# Patient Record
Sex: MALE | Race: WHITE | NOT HISPANIC OR LATINO | Employment: UNEMPLOYED | URBAN - METROPOLITAN AREA
[De-identification: names, ages, dates, MRNs, and addresses within clinical notes are randomized per-mention and may not be internally consistent; named-entity substitution may affect disease eponyms.]

---

## 2018-09-06 ENCOUNTER — OFFICE VISIT (OUTPATIENT)
Dept: URGENT CARE | Facility: CLINIC | Age: 33
End: 2018-09-06
Payer: COMMERCIAL

## 2018-09-06 VITALS
HEART RATE: 77 BPM | DIASTOLIC BLOOD PRESSURE: 72 MMHG | TEMPERATURE: 98 F | BODY MASS INDEX: 20.99 KG/M2 | RESPIRATION RATE: 16 BRPM | WEIGHT: 155 LBS | HEIGHT: 72 IN | SYSTOLIC BLOOD PRESSURE: 111 MMHG

## 2018-09-06 DIAGNOSIS — A56.01 CHLAMYDIAL URETHRITIS IN MALE: Primary | ICD-10-CM

## 2018-09-06 DIAGNOSIS — N34.2 URETHRITIS: ICD-10-CM

## 2018-09-06 PROCEDURE — 99213 OFFICE O/P EST LOW 20 MIN: CPT | Mod: 25,S$GLB,, | Performed by: INTERNAL MEDICINE

## 2018-09-06 PROCEDURE — 96372 THER/PROPH/DIAG INJ SC/IM: CPT | Mod: S$GLB,,, | Performed by: INTERNAL MEDICINE

## 2018-09-06 PROCEDURE — 3008F BODY MASS INDEX DOCD: CPT | Mod: CPTII,S$GLB,, | Performed by: INTERNAL MEDICINE

## 2018-09-06 RX ORDER — AZITHROMYCIN 500 MG/1
TABLET, FILM COATED ORAL
Qty: 4 TABLET | Refills: 0 | Status: SHIPPED | OUTPATIENT
Start: 2018-09-06 | End: 2019-10-22 | Stop reason: ALTCHOICE

## 2018-09-06 RX ORDER — EMTRICITABINE AND TENOFOVIR DISOPROXIL FUMARATE 100; 150 MG/1; MG/1
1 TABLET, FILM COATED ORAL DAILY
COMMUNITY

## 2018-09-06 RX ORDER — CEFTRIAXONE 1 G/1
500 INJECTION, POWDER, FOR SOLUTION INTRAMUSCULAR; INTRAVENOUS
Status: COMPLETED | OUTPATIENT
Start: 2018-09-06 | End: 2018-09-06

## 2018-09-06 RX ORDER — DOXYCYCLINE 100 MG/1
100 CAPSULE ORAL 2 TIMES DAILY
Qty: 14 CAPSULE | Refills: 0 | Status: SHIPPED | OUTPATIENT
Start: 2018-09-06 | End: 2018-09-13

## 2018-09-06 RX ADMIN — CEFTRIAXONE 500 MG: 1 INJECTION, POWDER, FOR SOLUTION INTRAMUSCULAR; INTRAVENOUS at 04:09

## 2018-09-06 NOTE — PROGRESS NOTES
Subjective:       Patient ID: Neel Soliman is a 32 y.o. male.    Vitals:  height is 6' (1.829 m) and weight is 70.3 kg (155 lb). His temperature is 98.2 °F (36.8 °C). His blood pressure is 111/72 and his pulse is 77. His respiration is 16.     Chief Complaint: Exposure to STD    Pt recntly tested positive for gonorrhea, was treated prior  to testing. Pt states sympotoms have come back. Pt also thinks he has has chlamydia and wants to just be treated       Review of Systems   Constitution: Negative for fever.   Eyes: Negative for blurred vision.   Cardiovascular: Negative for chest pain.   Skin: Negative for rash.   Musculoskeletal: Negative for back pain and joint pain.   Gastrointestinal: Negative for abdominal pain, diarrhea, nausea and vomiting.   Psychiatric/Behavioral: The patient is not nervous/anxious.        Objective:      Physical Exam   Constitutional: He appears well-developed and well-nourished.   HENT:   Head: Normocephalic and atraumatic.   Eyes: Conjunctivae and EOM are normal. Pupils are equal, round, and reactive to light.   Nursing note and vitals reviewed.      Assessment:       1. Chlamydial urethritis in male    2. Urethritis        Plan:         Chlamydial urethritis in male  -     azithromycin (ZITHROMAX) 500 MG tablet; Take 4 po now  Dispense: 4 tablet; Refill: 0    Urethritis  -     cefTRIAXone injection 500 mg; Inject 0.5 g (500 mg total) into the muscle one time.  -     doxycycline (VIBRAMYCIN) 100 MG Cap; Take 1 capsule (100 mg total) by mouth 2 (two) times daily. for 7 days  Dispense: 14 capsule; Refill: 0

## 2018-12-28 ENCOUNTER — OFFICE VISIT (OUTPATIENT)
Dept: URGENT CARE | Facility: CLINIC | Age: 33
End: 2018-12-28
Payer: COMMERCIAL

## 2018-12-28 VITALS
DIASTOLIC BLOOD PRESSURE: 81 MMHG | HEIGHT: 72 IN | OXYGEN SATURATION: 96 % | TEMPERATURE: 97 F | RESPIRATION RATE: 19 BRPM | WEIGHT: 150 LBS | SYSTOLIC BLOOD PRESSURE: 138 MMHG | BODY MASS INDEX: 20.32 KG/M2 | HEART RATE: 88 BPM

## 2018-12-28 DIAGNOSIS — Z86.39 HISTORY OF ELEVATED GLUCOSE: ICD-10-CM

## 2018-12-28 DIAGNOSIS — R30.0 DYSURIA: ICD-10-CM

## 2018-12-28 DIAGNOSIS — Z72.51 UNPROTECTED SEX: Primary | ICD-10-CM

## 2018-12-28 LAB
BILIRUB UR QL STRIP: NEGATIVE
GLUCOSE UR QL STRIP: NEGATIVE
KETONES UR QL STRIP: NEGATIVE
LEUKOCYTE ESTERASE UR QL STRIP: NEGATIVE
PH, POC UA: 7.5 (ref 5–8)
POC BLOOD, URINE: NEGATIVE
POC NITRATES, URINE: NEGATIVE
PROT UR QL STRIP: NEGATIVE
SP GR UR STRIP: 1.01 (ref 1–1.03)
UROBILINOGEN UR STRIP-ACNC: NORMAL (ref 0.3–2.2)

## 2018-12-28 PROCEDURE — 81003 URINALYSIS AUTO W/O SCOPE: CPT | Mod: QW,S$GLB,, | Performed by: FAMILY MEDICINE

## 2018-12-28 PROCEDURE — 3008F BODY MASS INDEX DOCD: CPT | Mod: CPTII,S$GLB,, | Performed by: FAMILY MEDICINE

## 2018-12-28 PROCEDURE — 99214 OFFICE O/P EST MOD 30 MIN: CPT | Mod: 25,S$GLB,, | Performed by: FAMILY MEDICINE

## 2018-12-28 NOTE — PATIENT INSTRUCTIONS
If You Think You Have an STD  Treating a sexually transmitted disease (STD) early limits the problems they can cause. If you have an STD, get treated right away. Ask your partner to be tested, too. Then avoid sex until youve finished treatment and your healthcare provider says its OK to have sex again.    Follow your treatment plan  Treatment depends on the type of STD you have. Common treatments include injections and oral pills or liquids. Creams and gels can be applied to sores caused by certain STDs. Follow the tips below:  · Get new treatment for each new STD.  · Dont use old medicine, even for the same STD. Use medicines as directed.  · Dont share medicine unless instructed to do so by your healthcare provider or clinic.  Talk to your partner  If you have an STD, its your duty to tell all your recent partners so they can be tested and treated. This is one important way to prevent the disease from being spread. Telling a partner that you have an STD can be hard. You may be embarrassed, angry, or afraid. Its often unclear who had the STD first. So try not to place blame. Your healthcare provider may offer some advice on how to begin.  Prevent future problems  Even after youve been treated, you can still be infected again. This is a common problem. It happens when a partner passes the STD back to you. To avoid this, your partner must be tested. He or she may also need treatment. After treatment, go to any scheduled follow-up visits. Then prevent future problems with safer sex. Limit your number of partners and always use a latex condom.  Diagnosing STDS  Your healthcare provider will take a health history and examine you. During your health history, you will be asked about your sex habits and health history. You may also be asked about drug use. Give honest answers. Your healthcare provider will then check your body for signs of STDs. He or she also may perform one or more of the following  tests:  · Fluid is swabbed from any sores. Samples also may be taken from the vagina, penis, mouth, or rectum. The samples are then tested for STDs.  · Blood or urine samples may be taken. They are checked for viruses or bacteria that cause STDs.  · For women, cells from the cervix (where the vagina and uterus meet) are checked for signs of cancer. This is called a Pap smear. If cell changes are found, a magnifying scope may be used to take a closer look (colposcopy).   Date Last Reviewed: 12/1/2016  © 8139-1690 [employer]. [address] [place] [address], [place] [address]. All rights reserved. This information is not intended as a substitute for professional medical care. Always follow your healthcare professional's instructions.      Follow up with your doctor in a few days as needed.  Return to the urgent care or go to the ER if symptoms get worse.    Christiano Springer MD

## 2018-12-28 NOTE — PROGRESS NOTES
Subjective:       Patient ID: Neel Soliman is a 33 y.o. male.    Vitals:  height is 6' (1.829 m) and weight is 68 kg (150 lb). His oral temperature is 97.1 °F (36.2 °C). His blood pressure is 138/81 and his pulse is 88. His respiration is 19 and oxygen saturation is 96%.     Chief Complaint: STD CHECK (Pt has dysuria, itching. Pt denies discharge. Pt also requests bloodwork for A1c and hematocrit levels.)    Pt wants a full STI panel as well as bloodwork to check his A1c and hematocrit levels. Pt has a partner with STI symptoms      Exposure to STD   The patient's primary symptoms include genital itching. The patient's pertinent negatives include no penile discharge, penile pain, scrotal swelling or testicular pain. This is a new problem. The current episode started in the past 7 days (5 days ago). The problem occurs constantly. The problem has been unchanged. The patient is experiencing no pain. Associated symptoms include dysuria. Pertinent negatives include no abdominal pain, chills, fever, frequency, nausea, rash, urgency or vomiting. The symptoms are aggravated by urination, intercourse and activity. He has tried nothing for the symptoms. He is sexually active. He inconsistently uses condoms. Yes, his partner has an STD.   pt also c/o having elevated glucose level on his recent lab test.  Fasting glucose was 103.  Pt has no headache, no dizziness, no weakness, no urinary symptoms.    Constitution: Negative for chills and fever.   Neck: Negative for painful lymph nodes.   Gastrointestinal: Negative for abdominal pain, nausea and vomiting.   Genitourinary: Positive for dysuria. Negative for frequency, urgency, urine decreased, hematuria, history of kidney stones, genital trauma, painful intercourse, genital sore, penile discharge, painful ejaculation, penile pain, penile swelling, scrotal swelling and testicular pain.   Musculoskeletal: Negative for back pain.   Skin: Negative for rash, lesion and erythema.    Hematologic/Lymphatic: Negative for swollen lymph nodes.       Objective:      Physical Exam   Constitutional: He is oriented to person, place, and time. He appears well-developed and well-nourished.   HENT:   Head: Normocephalic and atraumatic.   Eyes: EOM are normal. Pupils are equal, round, and reactive to light.   Neck: Normal range of motion. Neck supple. No JVD present. No tracheal deviation present. No thyromegaly present.   Cardiovascular: Normal rate, regular rhythm and normal heart sounds. Exam reveals no gallop and no friction rub.   No murmur heard.  Pulmonary/Chest: Breath sounds normal. No respiratory distress. He has no wheezes. He has no rales. He exhibits no tenderness.   Abdominal: Soft. Bowel sounds are normal. He exhibits no distension and no mass. There is no tenderness. There is no rebound and no guarding. No hernia.   Genitourinary: Penis normal.   Genitourinary Comments: No penile discharge, no genital lesion.   Musculoskeletal: Normal range of motion. He exhibits no edema, tenderness or deformity.   Lymphadenopathy:     He has no cervical adenopathy.   Neurological: He is alert and oriented to person, place, and time. He displays normal reflexes. No cranial nerve deficit. He exhibits normal muscle tone. Coordination normal.   Skin: Skin is warm. Capillary refill takes less than 2 seconds. No rash noted. No erythema. No pallor.   Psychiatric: He has a normal mood and affect. His behavior is normal. Judgment and thought content normal.   Vitals reviewed.      Assessment:       1. Dysuria    2. Unprotected sex    3. History of elevated glucose        Plan:         Dysuria  -     POCT Urinalysis, Dipstick, Automated, W/O Scope    Unprotected sex  -     C. trachomatis/N. gonorrhoeae by AMP DNA  -     RPR  -     HIV-1 and HIV-2 antibodies  -     Herpes simplex type 1&2 IgG,Herpes titer  -     Herpes simplex type 1 & 2 IgM,Herpes IgM  -     Hepatitis panel, acute    History of elevated glucose  -      Hemoglobin A1c          Patient Instructions       If You Think You Have an STD  Treating a sexually transmitted disease (STD) early limits the problems they can cause. If you have an STD, get treated right away. Ask your partner to be tested, too. Then avoid sex until youve finished treatment and your healthcare provider says its OK to have sex again.    Follow your treatment plan  Treatment depends on the type of STD you have. Common treatments include injections and oral pills or liquids. Creams and gels can be applied to sores caused by certain STDs. Follow the tips below:  · Get new treatment for each new STD.  · Dont use old medicine, even for the same STD. Use medicines as directed.  · Dont share medicine unless instructed to do so by your healthcare provider or clinic.  Talk to your partner  If you have an STD, its your duty to tell all your recent partners so they can be tested and treated. This is one important way to prevent the disease from being spread. Telling a partner that you have an STD can be hard. You may be embarrassed, angry, or afraid. Its often unclear who had the STD first. So try not to place blame. Your healthcare provider may offer some advice on how to begin.  Prevent future problems  Even after youve been treated, you can still be infected again. This is a common problem. It happens when a partner passes the STD back to you. To avoid this, your partner must be tested. He or she may also need treatment. After treatment, go to any scheduled follow-up visits. Then prevent future problems with safer sex. Limit your number of partners and always use a latex condom.  Diagnosing STDS  Your healthcare provider will take a health history and examine you. During your health history, you will be asked about your sex habits and health history. You may also be asked about drug use. Give honest answers. Your healthcare provider will then check your body for signs of STDs. He or she also  may perform one or more of the following tests:  · Fluid is swabbed from any sores. Samples also may be taken from the vagina, penis, mouth, or rectum. The samples are then tested for STDs.  · Blood or urine samples may be taken. They are checked for viruses or bacteria that cause STDs.  · For women, cells from the cervix (where the vagina and uterus meet) are checked for signs of cancer. This is called a Pap smear. If cell changes are found, a magnifying scope may be used to take a closer look (colposcopy).   Date Last Reviewed: 12/1/2016  © 5985-9362 [employer]. [address] [place] [address], [place] [address]. All rights reserved. This information is not intended as a substitute for professional medical care. Always follow your healthcare professional's instructions.      Follow up with your doctor in a few days as needed.  Return to the urgent care or go to the ER if symptoms get worse.    Christiano Springer MD

## 2018-12-29 LAB
C TRACH RRNA SPEC QL NAA+PROBE: NEGATIVE
HBA1C MFR BLD: 4.7 % (ref 4.8–5.6)
N GONORRHOEA RRNA SPEC QL NAA+PROBE: NEGATIVE

## 2018-12-31 LAB
HAV IGM SERPL QL IA: NEGATIVE
HBV CORE IGM SERPL QL IA: NEGATIVE
HBV SURFACE AG SERPL QL IA: NEGATIVE
HCV AB S/CO SERPL IA: <0.1 S/CO RATIO (ref 0–0.9)
HIV 1+2 AB+HIV1 P24 AG SERPL QL IA: NON REACTIVE
HSV1 IGG SER IA-ACNC: 53.2 INDEX (ref 0–0.9)
HSV1+2 IGM SER IA-ACNC: <0.91 RATIO (ref 0–0.9)
HSV2 IGG SER IA-ACNC: <0.91 INDEX (ref 0–0.9)
RPR SER QL: NON REACTIVE

## 2019-01-03 ENCOUNTER — TELEPHONE (OUTPATIENT)
Dept: URGENT CARE | Facility: CLINIC | Age: 34
End: 2019-01-03

## 2019-01-07 ENCOUNTER — TELEPHONE (OUTPATIENT)
Dept: URGENT CARE | Facility: CLINIC | Age: 34
End: 2019-01-07

## 2019-01-09 ENCOUNTER — TELEPHONE (OUTPATIENT)
Dept: URGENT CARE | Facility: CLINIC | Age: 34
End: 2019-01-09

## 2019-01-12 ENCOUNTER — OFFICE VISIT (OUTPATIENT)
Dept: URGENT CARE | Facility: CLINIC | Age: 34
End: 2019-01-12
Payer: COMMERCIAL

## 2019-01-12 VITALS
OXYGEN SATURATION: 98 % | SYSTOLIC BLOOD PRESSURE: 127 MMHG | DIASTOLIC BLOOD PRESSURE: 78 MMHG | TEMPERATURE: 97 F | HEART RATE: 79 BPM | WEIGHT: 150 LBS | BODY MASS INDEX: 20.32 KG/M2 | HEIGHT: 72 IN

## 2019-01-12 DIAGNOSIS — N48.89 PENILE IRRITATION: Primary | ICD-10-CM

## 2019-01-12 PROCEDURE — 3008F BODY MASS INDEX DOCD: CPT | Mod: CPTII,S$GLB,, | Performed by: FAMILY MEDICINE

## 2019-01-12 PROCEDURE — 3008F PR BODY MASS INDEX (BMI) DOCUMENTED: ICD-10-PCS | Mod: CPTII,S$GLB,, | Performed by: FAMILY MEDICINE

## 2019-01-12 PROCEDURE — 99213 OFFICE O/P EST LOW 20 MIN: CPT | Mod: S$GLB,,, | Performed by: FAMILY MEDICINE

## 2019-01-12 PROCEDURE — 99213 PR OFFICE/OUTPT VISIT, EST, LEVL III, 20-29 MIN: ICD-10-PCS | Mod: S$GLB,,, | Performed by: FAMILY MEDICINE

## 2019-01-12 RX ORDER — DOXYCYCLINE 100 MG/1
100 CAPSULE ORAL 2 TIMES DAILY
Qty: 20 CAPSULE | Refills: 0 | Status: SHIPPED | OUTPATIENT
Start: 2019-01-12 | End: 2019-10-22

## 2019-01-12 NOTE — PROGRESS NOTES
Subjective:       Patient ID: Neel Soliman is a 33 y.o. male.    Vitals:  height is 6' (1.829 m) and weight is 68 kg (150 lb). His temperature is 97.3 °F (36.3 °C). His blood pressure is 127/78 and his pulse is 79. His oxygen saturation is 98%.     Chief Complaint: Penis Pain (itching)    Penis Pain   The patient's primary symptoms include genital itching and penile pain. The patient's pertinent negatives include no penile discharge, scrotal swelling or testicular pain. This is a chronic problem. Episode onset: 2 weeks ago. The problem occurs intermittently. The problem has been unchanged. The pain is mild. Pertinent negatives include no abdominal pain, chills, dysuria, fever, frequency, nausea, rash, urgency or vomiting. Nothing aggravates the symptoms. He has tried antibiotics for the symptoms. The treatment provided no relief. He is sexually active. He never uses condoms. Yes, his partner has an STD.   pt sexual partner in [place] was recently diagnosed with mycoplasma genitalium.    Constitution: Negative for chills and fever.   Neck: Negative for painful lymph nodes.   Gastrointestinal: Negative for abdominal pain, nausea and vomiting.   Genitourinary: Positive for penile pain. Negative for dysuria, frequency, urgency, urine decreased, hematuria, history of kidney stones, genital trauma, painful intercourse, genital sore, penile discharge, painful ejaculation, penile swelling, scrotal swelling and testicular pain.        Penile itching.   Musculoskeletal: Negative for back pain.   Skin: Negative for rash, lesion and erythema.   Hematologic/Lymphatic: Negative for swollen lymph nodes.       Objective:      Physical Exam   Constitutional: He is oriented to person, place, and time. He appears well-developed and well-nourished.   HENT:   Head: Normocephalic and atraumatic.   Eyes: EOM are normal. Pupils are equal, round, and reactive to light.   Neck: Normal range of motion. Neck supple. No thyromegaly present.    Cardiovascular: Normal rate, regular rhythm and normal heart sounds. Exam reveals no gallop and no friction rub.   No murmur heard.  Pulmonary/Chest: Breath sounds normal. No respiratory distress. He has no wheezes. He has no rales. He exhibits no tenderness.   Abdominal: Soft. Bowel sounds are normal. He exhibits no distension and no mass. There is no tenderness. There is no rebound and no guarding. No hernia.   Genitourinary:   Genitourinary Comments: No penile discharge, no genital lesion.   Musculoskeletal: Normal range of motion. He exhibits no edema, tenderness or deformity.   Lymphadenopathy:     He has no cervical adenopathy.   Neurological: He is alert and oriented to person, place, and time. He displays normal reflexes. No cranial nerve deficit. He exhibits normal muscle tone. Coordination normal.   Skin: Skin is warm. Capillary refill takes less than 2 seconds. No rash noted. No erythema. No pallor.   Psychiatric: He has a normal mood and affect. His behavior is normal. Judgment and thought content normal.   Vitals reviewed.      Assessment:       1. Penile irritation        Plan:         Penile irritation  -     doxycycline (VIBRAMYCIN) 100 MG Cap; Take 1 capsule (100 mg total) by mouth 2 (two) times daily.  Dispense: 20 capsule; Refill: 0    exposure to mycoplasma genitalium.      Patient Instructions   Follow up with your doctor in a few days.  Go to the ER if symptoms get worse.    Christiano Springer MD

## 2019-01-13 NOTE — PATIENT INSTRUCTIONS
Follow up with your doctor in a few days.  Go to the ER if symptoms get worse.    Christiano Springer MD

## 2019-02-20 ENCOUNTER — OFFICE VISIT (OUTPATIENT)
Dept: URGENT CARE | Facility: CLINIC | Age: 34
End: 2019-02-20
Payer: COMMERCIAL

## 2019-02-20 VITALS
BODY MASS INDEX: 20.32 KG/M2 | WEIGHT: 150 LBS | TEMPERATURE: 98 F | DIASTOLIC BLOOD PRESSURE: 62 MMHG | OXYGEN SATURATION: 97 % | HEIGHT: 72 IN | SYSTOLIC BLOOD PRESSURE: 105 MMHG | HEART RATE: 75 BPM

## 2019-02-20 DIAGNOSIS — A56.01 CHLAMYDIAL URETHRITIS: Primary | ICD-10-CM

## 2019-02-20 PROCEDURE — 3008F PR BODY MASS INDEX (BMI) DOCUMENTED: ICD-10-PCS | Mod: CPTII,S$GLB,, | Performed by: NURSE PRACTITIONER

## 2019-02-20 PROCEDURE — 96372 PR INJECTION,THERAP/PROPH/DIAG2ST, IM OR SUBCUT: ICD-10-PCS | Mod: S$GLB,,, | Performed by: NURSE PRACTITIONER

## 2019-02-20 PROCEDURE — 3008F BODY MASS INDEX DOCD: CPT | Mod: CPTII,S$GLB,, | Performed by: NURSE PRACTITIONER

## 2019-02-20 PROCEDURE — 96372 THER/PROPH/DIAG INJ SC/IM: CPT | Mod: S$GLB,,, | Performed by: NURSE PRACTITIONER

## 2019-02-20 PROCEDURE — 99214 OFFICE O/P EST MOD 30 MIN: CPT | Mod: 25,S$GLB,, | Performed by: NURSE PRACTITIONER

## 2019-02-20 PROCEDURE — 99214 PR OFFICE/OUTPT VISIT, EST, LEVL IV, 30-39 MIN: ICD-10-PCS | Mod: 25,S$GLB,, | Performed by: NURSE PRACTITIONER

## 2019-02-20 RX ORDER — CEFTRIAXONE 250 MG/1
250 INJECTION, POWDER, FOR SOLUTION INTRAMUSCULAR; INTRAVENOUS
Status: COMPLETED | OUTPATIENT
Start: 2019-02-20 | End: 2019-02-20

## 2019-02-20 RX ORDER — AZITHROMYCIN 250 MG/1
TABLET, FILM COATED ORAL
Qty: 4 TABLET | Refills: 0 | Status: SHIPPED | OUTPATIENT
Start: 2019-02-20 | End: 2019-10-22 | Stop reason: ALTCHOICE

## 2019-02-20 RX ADMIN — CEFTRIAXONE 250 MG: 250 INJECTION, POWDER, FOR SOLUTION INTRAMUSCULAR; INTRAVENOUS at 03:02

## 2019-02-20 NOTE — PROGRESS NOTES
Subjective:       Patient ID: Neel Soliman is a 33 y.o. male.    Vitals:  height is 6' (1.829 m) and weight is 68 kg (150 lb). His oral temperature is 98.2 °F (36.8 °C). His blood pressure is 105/62 and his pulse is 75. His oxygen saturation is 97%.     Chief Complaint: Exposure to STD    Patient presents with dysuria x3 days after having unprotected intercourse (oral and anal) with a male partner who notified him recently that he is positive for Chlamydia and pending Gonorrhea.  Patient is on Prep.  He had routine testing 2 weeks ago and does not want to be tested.  He would like to just go ahead and be treated for Chlamydia and Gonorrhea.        Exposure to STD   The patient's primary symptoms include genital itching and penile pain (only when he urinates). The patient's pertinent negatives include no genital injury, genital lesions, pelvic pain, penile discharge, priapism, scrotal swelling or testicular pain. This is a new problem. The current episode started in the past 7 days (3 days). The problem occurs constantly. The problem has been gradually worsening. The pain is mild. Associated symptoms include dysuria. Pertinent negatives include no abdominal pain, chest pain, chills, discolored urine, fever, flank pain, frequency, hematuria, painful intercourse, rash or urgency. Nothing aggravates the symptoms. He has tried nothing for the symptoms. The treatment provided no relief. He is sexually active. He inconsistently uses condoms. Yes, his partner has an STD. His past medical history is significant for chlamydia. There is no history of gonorrhea.       Constitution: Negative for chills and fever.   Neck: Negative for painful lymph nodes.   Cardiovascular: Negative for chest pain.   Gastrointestinal: Negative for abdominal pain.   Genitourinary: Positive for dysuria and penile pain (only when he urinates). Negative for frequency, urgency, urine decreased, flank pain, history of kidney stones, genital trauma,  painful intercourse, genital sore, penile discharge, painful ejaculation, penile swelling, scrotal swelling, testicular pain and pelvic pain.   Musculoskeletal: Negative for back pain.   Skin: Negative for rash and lesion.   Hematologic/Lymphatic: Negative for swollen lymph nodes.       Objective:      Physical Exam   Constitutional: He is oriented to person, place, and time. He appears well-developed and well-nourished.  Non-toxic appearance. He does not have a sickly appearance. He does not appear ill. No distress.   HENT:   Head: Normocephalic and atraumatic.   Right Ear: External ear normal.   Left Ear: External ear normal.   Nose: Mucosal edema present. No rhinorrhea, nose lacerations, sinus tenderness or nasal deformity. No epistaxis. Right sinus exhibits no maxillary sinus tenderness and no frontal sinus tenderness. Left sinus exhibits no maxillary sinus tenderness and no frontal sinus tenderness.   Mouth/Throat: Mucous membranes are normal. Posterior oropharyngeal edema (cobblestone appearance) present. No oropharyngeal exudate or posterior oropharyngeal erythema.   Eyes: Conjunctivae and lids are normal.   Neck: Trachea normal, normal range of motion and phonation normal. Neck supple.   Cardiovascular: Normal rate, regular rhythm, normal heart sounds and intact distal pulses.   Pulmonary/Chest: Effort normal and breath sounds normal.   Abdominal: Soft. Normal appearance and bowel sounds are normal. He exhibits no distension. There is no tenderness. There is no CVA tenderness.   Genitourinary:   Genitourinary Comments: Patient deferred   Musculoskeletal: Normal range of motion.   Neurological: He is alert and oriented to person, place, and time. He has normal reflexes.   Skin: Skin is warm, dry and intact. He is not diaphoretic.   Psychiatric: He has a normal mood and affect. His speech is normal and behavior is normal. Judgment and thought content normal. Cognition and memory are normal.   Nursing note and  vitals reviewed.      Assessment:       1. Chlamydial urethritis        Plan:         Chlamydial urethritis  -     azithromycin (Z-JESSIE) 250 MG tablet; 4 pills po once  Dispense: 4 tablet; Refill: 0  -     cefTRIAXone injection 250 mg      Patient Instructions   Increase condom use to prevent further occurance.  Notify sexual partners of the need for testing.  Complete ALL medication prescribed.  NO sexual intercourse for 7 days after treatment. Retest to ensure infection has cleared-there are infections that require more agressive treatment.  Retest for all ini 6 weeks and again in 6 monts to ensure true negative results.  Today's testing will give no crediable information if you have unprotected sexual activities going forward. Syphillis cases are rising!  Gonorrhea has RESISTANT strains which is why repeat testing after treatment is important.  Gonorrhea may be present in multiple sites from just ONE area of exposure.  For those who have high risk sexual behaviors and are on Truvada for PrEP- you have additional protection against HIV ONLY.  REMEMBER WEAR CONDOMS AND GET TESTED OFTEN.   Chlamydia Urethritis, Treated (Male)  You have an infection in the channel in the penis that carries urine (the urethra). The infection is caused by the bacteria chlamydia. This infection is a sexually transmitted disease (STD). It is highly contagious. It's spread by sexual contact with an infected partner.     Symptoms most often begin within 1 week after you come in contact with the bacteria. But it may take 3 weeks for symptoms to show up. You may have a watery discharge from the penis and burning during urination.   This infection can be treated and cured. Treatment is with antibiotic medicine.  Home care  Follow these guidelines when caring for yourself at home:  · Your sexual partner needs to be treated even if he or she has no symptoms. Your partner should contact his or her own healthcare provider. Or your partner can go  to an urgent care clinic or your local health department to be examined and treated.  · Avoid sexual activity until both you and your partner have finished all antibiotic medicine. You should wait until your provider tells you that you are no longer contagious.  · Take all antibiotic medicine as directed until it is finished. If you stop the medicine before you have finished it, symptoms may come back.  · Learn about safe sex practices and use these in the future. The safest sex is with a partner who has tested negative and has sex only with you. Condoms can help stop spreading some STDs. These include gonorrhea, chlamydia, and HIV. But condoms are not a guarantee.  Follow-up care  Follow up with your health care provider, or as advised. If a culture test was taken, you may call as advised for the results. You should have another culture test 4 to 6 weeks after treatment. This is to make sure the infection is gone. Call your provider or your local health department for complete STD screening. This includes HIV testing. Call the [facility] National STD Hotline at 193-090-4910 for more information about STDs.  When to seek medical advice  Call your health care provider right away if any of these occur:  · You dont get better after 3 days of treatment  · You cant urinate because of the pain  · Rash or joint pain  · Painful sores on the penis  · Enlarged painful lumps (lymph nodes) in the groin  · Testicle pain or swelling of the scrotum  · Fever of 100.4°F (38°C) or above, lasting for 24 to 48 hours  · Blood in urine   Date Last Reviewed: 1/1/2017  © 3666-6198 [employer]. [address] [place] [address], [place] [address]. All rights reserved. This information is not intended as a substitute for professional medical care. Always follow your healthcare professional's instructions.

## 2019-02-20 NOTE — PATIENT INSTRUCTIONS
Increase condom use to prevent further occurance.  Notify sexual partners of the need for testing.  Complete ALL medication prescribed.  NO sexual intercourse for 7 days after treatment. Retest to ensure infection has cleared-there are infections that require more agressive treatment.  Retest for all ini 6 weeks and again in 6 monts to ensure true negative results.  Today's testing will give no crediable information if you have unprotected sexual activities going forward. Syphillis cases are rising!  Gonorrhea has RESISTANT strains which is why repeat testing after treatment is important.  Gonorrhea may be present in multiple sites from just ONE area of exposure.  For those who have high risk sexual behaviors and are on Truvada for PrEP- you have additional protection against HIV ONLY.  REMEMBER WEAR CONDOMS AND GET TESTED OFTEN.   Chlamydia Urethritis, Treated (Male)  You have an infection in the channel in the penis that carries urine (the urethra). The infection is caused by the bacteria chlamydia. This infection is a sexually transmitted disease (STD). It is highly contagious. It's spread by sexual contact with an infected partner.     Symptoms most often begin within 1 week after you come in contact with the bacteria. But it may take 3 weeks for symptoms to show up. You may have a watery discharge from the penis and burning during urination.   This infection can be treated and cured. Treatment is with antibiotic medicine.  Home care  Follow these guidelines when caring for yourself at home:  · Your sexual partner needs to be treated even if he or she has no symptoms. Your partner should contact his or her own healthcare provider. Or your partner can go to an urgent care clinic or your local health department to be examined and treated.  · Avoid sexual activity until both you and your partner have finished all antibiotic medicine. You should wait until your provider tells you that you are no longer  contagious.  · Take all antibiotic medicine as directed until it is finished. If you stop the medicine before you have finished it, symptoms may come back.  · Learn about safe sex practices and use these in the future. The safest sex is with a partner who has tested negative and has sex only with you. Condoms can help stop spreading some STDs. These include gonorrhea, chlamydia, and HIV. But condoms are not a guarantee.  Follow-up care  Follow up with your health care provider, or as advised. If a culture test was taken, you may call as advised for the results. You should have another culture test 4 to 6 weeks after treatment. This is to make sure the infection is gone. Call your provider or your local health department for complete STD screening. This includes HIV testing. Call the [facility] National STD Hotline at 939-473-3368 for more information about STDs.  When to seek medical advice  Call your health care provider right away if any of these occur:  · You dont get better after 3 days of treatment  · You cant urinate because of the pain  · Rash or joint pain  · Painful sores on the penis  · Enlarged painful lumps (lymph nodes) in the groin  · Testicle pain or swelling of the scrotum  · Fever of 100.4°F (38°C) or above, lasting for 24 to 48 hours  · Blood in urine   Date Last Reviewed: 1/1/2017  © 0409-6895 [employer]. [address] [place] [address], [place] [address]. All rights reserved. This information is not intended as a substitute for professional medical care. Always follow your healthcare professional's instructions.

## 2019-10-21 ENCOUNTER — OFFICE VISIT (OUTPATIENT)
Dept: SURGERY | Facility: CLINIC | Age: 34
End: 2019-10-21
Payer: COMMERCIAL

## 2019-10-21 VITALS
SYSTOLIC BLOOD PRESSURE: 105 MMHG | HEART RATE: 80 BPM | WEIGHT: 166.44 LBS | DIASTOLIC BLOOD PRESSURE: 61 MMHG | BODY MASS INDEX: 22.54 KG/M2 | HEIGHT: 72 IN

## 2019-10-21 DIAGNOSIS — K64.8 INTERNAL HEMORRHOIDS: Primary | ICD-10-CM

## 2019-10-21 PROCEDURE — 99999 PR PBB SHADOW E&M-EST. PATIENT-LVL III: CPT | Mod: PBBFAC,,, | Performed by: COLON & RECTAL SURGERY

## 2019-10-21 PROCEDURE — 99203 PR OFFICE/OUTPT VISIT, NEW, LEVL III, 30-44 MIN: ICD-10-PCS | Mod: 25,S$GLB,, | Performed by: COLON & RECTAL SURGERY

## 2019-10-21 PROCEDURE — 3008F BODY MASS INDEX DOCD: CPT | Mod: CPTII,S$GLB,, | Performed by: COLON & RECTAL SURGERY

## 2019-10-21 PROCEDURE — 46600 PR DIAG2STIC A2SCOPY: ICD-10-PCS | Mod: S$GLB,,, | Performed by: COLON & RECTAL SURGERY

## 2019-10-21 PROCEDURE — 99999 PR PBB SHADOW E&M-EST. PATIENT-LVL III: ICD-10-PCS | Mod: PBBFAC,,, | Performed by: COLON & RECTAL SURGERY

## 2019-10-21 PROCEDURE — 3008F PR BODY MASS INDEX (BMI) DOCUMENTED: ICD-10-PCS | Mod: CPTII,S$GLB,, | Performed by: COLON & RECTAL SURGERY

## 2019-10-21 PROCEDURE — 46600 DIAGNOSTIC ANOSCOPY SPX: CPT | Mod: S$GLB,,, | Performed by: COLON & RECTAL SURGERY

## 2019-10-21 PROCEDURE — 99203 OFFICE O/P NEW LOW 30 MIN: CPT | Mod: 25,S$GLB,, | Performed by: COLON & RECTAL SURGERY

## 2019-10-21 RX ORDER — HYDROCORTISONE ACETATE PRAMOXINE HCL 2.5; 1 G/100G; G/100G
CREAM TOPICAL 2 TIMES DAILY
Qty: 30 G | Refills: 5 | Status: SHIPPED | OUTPATIENT
Start: 2019-10-21 | End: 2019-10-31

## 2019-10-22 ENCOUNTER — OFFICE VISIT (OUTPATIENT)
Dept: ENDOCRINOLOGY | Facility: CLINIC | Age: 34
End: 2019-10-22
Payer: COMMERCIAL

## 2019-10-22 VITALS
HEIGHT: 72 IN | HEART RATE: 94 BPM | DIASTOLIC BLOOD PRESSURE: 68 MMHG | SYSTOLIC BLOOD PRESSURE: 130 MMHG | BODY MASS INDEX: 22.54 KG/M2 | WEIGHT: 166.44 LBS

## 2019-10-22 DIAGNOSIS — Z79.899 ON PRE-EXPOSURE PROPHYLAXIS FOR HIV: ICD-10-CM

## 2019-10-22 DIAGNOSIS — N62 GYNECOMASTIA: Primary | ICD-10-CM

## 2019-10-22 PROCEDURE — 3008F PR BODY MASS INDEX (BMI) DOCUMENTED: ICD-10-PCS | Mod: CPTII,S$GLB,, | Performed by: STUDENT IN AN ORGANIZED HEALTH CARE EDUCATION/TRAINING PROGRAM

## 2019-10-22 PROCEDURE — 99999 PR PBB SHADOW E&M-EST. PATIENT-LVL III: ICD-10-PCS | Mod: PBBFAC,,, | Performed by: STUDENT IN AN ORGANIZED HEALTH CARE EDUCATION/TRAINING PROGRAM

## 2019-10-22 PROCEDURE — 3008F BODY MASS INDEX DOCD: CPT | Mod: CPTII,S$GLB,, | Performed by: STUDENT IN AN ORGANIZED HEALTH CARE EDUCATION/TRAINING PROGRAM

## 2019-10-22 PROCEDURE — 99203 OFFICE O/P NEW LOW 30 MIN: CPT | Mod: S$GLB,,, | Performed by: STUDENT IN AN ORGANIZED HEALTH CARE EDUCATION/TRAINING PROGRAM

## 2019-10-22 PROCEDURE — 99203 PR OFFICE/OUTPT VISIT, NEW, LEVL III, 30-44 MIN: ICD-10-PCS | Mod: S$GLB,,, | Performed by: STUDENT IN AN ORGANIZED HEALTH CARE EDUCATION/TRAINING PROGRAM

## 2019-10-22 PROCEDURE — 99999 PR PBB SHADOW E&M-EST. PATIENT-LVL III: CPT | Mod: PBBFAC,,, | Performed by: STUDENT IN AN ORGANIZED HEALTH CARE EDUCATION/TRAINING PROGRAM

## 2019-10-22 RX ORDER — LORAZEPAM 2 MG/1
1 TABLET ORAL NIGHTLY
Refills: 1 | COMMUNITY
Start: 2019-10-03

## 2019-10-22 RX ORDER — LAMOTRIGINE 25 MG/1
25 TABLET ORAL DAILY
Refills: 2 | COMMUNITY
Start: 2019-10-03

## 2019-10-22 NOTE — PROGRESS NOTES
Subjective:      Patient ID: Neel Soliman is a 34 y.o. male.    Chief Complaint:  Gynecomastia and Elevated estradiol    History of Present Illness  A 35 yo man with c/o gynecomastia and decreased libido is here in endocrine clinic for further evaluation.    With regards to gynecomastia:    Started to notice an enlargement in breast size (B/L) in 6th grade.   B/L breast tenderness +  Nipple discharge: no    Medications/ OTC Meds:  Has been on Truvada since 5 years.  Has been on Lamictal for about 2 weeks  Takes Adderall and Ativan PRN    Reports palpitations.  Denies cold/heat intolerance, hair loss, brittle nails, tremors, changes in weight.    Reports decreased libido, fatigue since 3 months.  Reports erectile dysfunction. Reports using Sildenafil PRN.    Reports using GBL over the summer when he was living in [place].    Smokes marijuana about twice per month. Has been taking CBD over the past few days before bed.  Drinks about one glass of wine, 3x per week.  Reports taking OTC pre-workout supplements.    Pituitary symptoms:  Denies unusual headache, vision changes.     There is no history of renal or hepatic disease.  No history of cancer.      FH:  No history of gynecomastia or breast cancer.     Review of Systems   Constitutional: Negative for unexpected weight change.   Eyes: Negative for visual disturbance.   Respiratory: Negative for shortness of breath.    Cardiovascular: Negative for chest pain.   Gastrointestinal: Negative for abdominal pain.   Genitourinary: Negative for urgency.   Musculoskeletal: Negative for arthralgias.   Skin: Negative for wound.   Neurological: Negative for headaches.   Hematological: Does not bruise/bleed easily.   Psychiatric/Behavioral: Negative for sleep disturbance.       Objective:   Physical Exam   Constitutional: He is oriented to person, place, and time. He appears well-developed and well-nourished.   HENT:   Head: Normocephalic and atraumatic.   Neck: Neck supple. No  thyromegaly present.   Cardiovascular: Normal rate, regular rhythm and normal heart sounds.   Pulmonary/Chest: Effort normal. No respiratory distress. He exhibits no mass and no tenderness.   Abdominal: Soft. There is no tenderness.   Neurological: He is alert and oriented to person, place, and time.   Skin: Skin is warm. No rash noted.   Psychiatric: He has a normal mood and affect. His behavior is normal.       Wt Readings from Last 1 Encounters:   10/22/19 0912 75.5 kg (166 lb 7.2 oz)     BP Readings from Last 3 Encounters:   10/22/19 130/68   10/21/19 105/61   02/20/19 105/62     /68   Pulse 94   Ht 6' (1.829 m)   Wt 75.5 kg (166 lb 7.2 oz)   BMI 22.57 kg/m²       Lab Review:   Lab Results   Component Value Date    HGBA1C 4.7 (L) 12/28/2018     No results found for: CHOL, HDL, LDLCALC, TRIG, CHOLHDL  No results found for: NA, K, CL, CO2, GLU, BUN, CREATININE, CALCIUM, PROT, ALBUMIN, BILITOT, ALKPHOS, AST, ALT, ANIONGAP, ESTGFRAFRICA, EGFRNONAA, TSH      Assessment and Plan     Gynecomastia  Based on the exam, it is most likely pseudogynecomastia.   Prior labs done in Sept, 2019 were normal.     Will repeat total testosterone level (morning), along with LH and estradiol levels.   Recommended to stop marijuana and OTC supplementations.     On pre-exposure prophylaxis for HIV  On Truvada      Discussed with Dr. Corey SHELL, Sena Nicole MD,  have personally taken the history and examined the patient and agree with the resident's note as stated above.  No clinically significant gynecomastia  Labs are nl  Repeat   F/u prn

## 2019-10-22 NOTE — PROGRESS NOTES
Subjective:      Patient ID: Neel Soliman is a 34 y.o. male.    Chief Complaint:  No chief complaint on file.      History of Present Illness  Here due concerns about abnormal abnormal endocrine labs:     Labs were done to evaluate symptoms of fatigue.     Other concerns are:  Hair loss, mental fog, poor concentration   Complains of inability to lose weight     Denies palpitations  Denies constipation or diarrhea        Denies biotn use in past 7 days  Denies otc supplements     Depression:  Sleep:  Snoring:    No FH of thyroid or adrenal disease    Is on PREP         Review of Systems   Constitutional: Negative for unexpected weight change.   Eyes: Negative for visual disturbance.   Respiratory: Negative for shortness of breath.    Cardiovascular: Negative for chest pain.   Gastrointestinal: Negative for abdominal pain.   Musculoskeletal: Negative for myalgias.   Skin: Negative for wound.   Neurological: Negative for headaches.   Hematological: Does not bruise/bleed easily.   Psychiatric/Behavioral: Negative for sleep disturbance.       Objective:     There were no vitals taken for this visit.  BP Readings from Last 3 Encounters:   10/21/19 105/61   02/20/19 105/62   01/12/19 127/78     Wt Readings from Last 1 Encounters:   10/21/19 1549 75.5 kg (166 lb 7.2 oz)     There is no height or weight on file to calculate BMI.      Physical Exam   Constitutional: He appears well-developed.   HENT:   Right Ear: External ear normal.   Left Ear: External ear normal.   Nose: Nose normal.   Hearing normal    Neck: No tracheal deviation present. No thyromegaly present.   Cardiovascular: Normal rate.   No murmur heard.  Pulmonary/Chest: Effort normal and breath sounds normal.   Abdominal: Soft. He exhibits no mass.   No hernia noted   Musculoskeletal: He exhibits no edema.   Neurological: He is alert. No cranial nerve deficit or sensory deficit. Coordination and gait normal.        Skin: No rash noted.   No nodules   Psychiatric:  He has a normal mood and affect. Judgment normal.   Vitals reviewed.             Lab Review:   Lab Results   Component Value Date    HGBA1C 4.7 (L) 12/28/2018     No results found for: CHOL, HDL, LDLCALC, TRIG, CHOLHDL  No results found for: NA, K, CL, CO2, GLU, BUN, CREATININE, CALCIUM, PROT, ALBUMIN, BILITOT, ALKPHOS, AST, ALT, ANIONGAP, ESTGFRAFRICA, EGFRNONAA, TSH  No results found for: [id number]    Assessment and Plan     No problem-specific Assessment & Plan notes found for this encounter.

## 2019-10-22 NOTE — ASSESSMENT & PLAN NOTE
Based on the exam, it is most likely pseudogynecomastia.   Prior labs done in Sept, 2019 were normal.     Will repeat total testosterone level (morning), along with LH and estradiol levels.   Recommended to stop marijuana and OTC supplementations.

## 2019-10-29 ENCOUNTER — LAB VISIT (OUTPATIENT)
Dept: LAB | Facility: HOSPITAL | Age: 34
End: 2019-10-29
Attending: STUDENT IN AN ORGANIZED HEALTH CARE EDUCATION/TRAINING PROGRAM
Payer: COMMERCIAL

## 2019-10-29 DIAGNOSIS — N62 GYNECOMASTIA: ICD-10-CM

## 2019-10-29 LAB
ESTRADIOL SERPL-MCNC: 24 PG/ML (ref 11–44)
LH SERPL-ACNC: 3.3 MIU/ML (ref 0.6–12.1)
TESTOST SERPL-MCNC: 539 NG/DL (ref 304–1227)

## 2019-10-29 PROCEDURE — 36415 COLL VENOUS BLD VENIPUNCTURE: CPT | Mod: PO

## 2019-10-29 PROCEDURE — 82670 ASSAY OF TOTAL ESTRADIOL: CPT

## 2019-10-29 PROCEDURE — 84403 ASSAY OF TOTAL TESTOSTERONE: CPT

## 2019-10-29 PROCEDURE — 83002 ASSAY OF GONADOTROPIN (LH): CPT

## 2019-11-18 NOTE — PROGRESS NOTES
Subjective:       Patient ID: Neel Soliman is a 34 y.o. male.    Chief Complaint: Hemorrhoids    HPI  33 yo M recently treated for proctitis due to chlamydia infection while in [place].  +MSM and anal receptive intercourse.  He comes in today because he continues to have significant pain inside the anus. + occasional rectal bleeding. No rectal discharge.  No tenesmus.  No constipation.  He reports of the recent HIV test was negative.    Review of patient's allergies indicates:  No Known Allergies    History reviewed. No pertinent past medical history.    History reviewed. No pertinent surgical history.    Current Outpatient Medications   Medication Sig Dispense Refill    emtricitabine-tenofovir, TDF, (TRUVADA) 100-150 mg Tab Take 1 tablet by mouth once daily.       lamoTRIgine (LAMICTAL) 25 MG tablet Take 25 mg by mouth once daily.   2    LORazepam (ATIVAN) 2 MG Tab Take 1 mg by mouth every evening.   1     No current facility-administered medications for this visit.        Family History   Problem Relation Age of Onset    Hyperlipidemia Mother     Hyperlipidemia Father     Heart attack Maternal Grandfather        Social History     Socioeconomic History    Marital status: Single     Spouse name: Not on file    Number of children: Not on file    Years of education: Not on file    Highest education level: Not on file   Occupational History    Not on file   Social Needs    Financial resource strain: Not on file    Food insecurity:     Worry: Not on file     Inability: Not on file    Transportation needs:     Medical: Not on file     Non-medical: Not on file   Tobacco Use    Smoking status: Never Smoker    Smokeless tobacco: Never Used   Substance and Sexual Activity    Alcohol use: No     Frequency: Never    Drug use: No    Sexual activity: Yes     Partners: Male   Lifestyle    Physical activity:     Days per week: Not on file     Minutes per session: Not on file    Stress: Not on file    Relationships    Social connections:     Talks on phone: Not on file     Gets together: Not on file     Attends Anglican service: Not on file     Active member of club or organization: Not on file     Attends meetings of clubs or organizations: Not on file     Relationship status: Not on file   Other Topics Concern    Not on file   Social History Narrative    Not on file       Review of Systems   Constitutional: Negative for chills and fever.   HENT: Negative for congestion and sinus pressure.    Eyes: Negative for visual disturbance.   Respiratory: Negative for cough and shortness of breath.    Cardiovascular: Negative for chest pain and palpitations.   Gastrointestinal: Positive for rectal pain. Negative for abdominal distention, abdominal pain, anal bleeding, blood in stool, constipation, diarrhea, nausea and vomiting.   Endocrine: Negative for cold intolerance and heat intolerance.   Genitourinary: Negative for dysuria and frequency.   Musculoskeletal: Negative for arthralgias and back pain.   Skin: Negative for rash.   Allergic/Immunologic: Negative for immunocompromised state.   Neurological: Negative for dizziness, light-headedness and headaches.   Psychiatric/Behavioral: Negative for confusion. The patient is nervous/anxious.        Objective:      Physical Exam   Constitutional: He is oriented to person, place, and time. He appears well-developed and well-nourished.   HENT:   Head: Normocephalic and atraumatic.   Eyes: Conjunctivae are normal.   Pulmonary/Chest: Effort normal. No respiratory distress.   Abdominal: Soft. He exhibits no distension and no mass. There is no tenderness. There is no rebound and no guarding.   Genitourinary:   Genitourinary Comments: Perineum - normal perianal skin, no mass, no fissure, no external hemorrhoids, no external condyloma  AARON - good tone, + soft nodular lesion in the RAL position  Anoscopy - subacutely thrombosed internal hemorrhoid in the RAL position    Neurological: He is alert and oriented to person, place, and time.   Skin: Skin is warm and dry. No erythema.         No results found for: WBC, HGB, HCT, MCV, PLT  BMP  No results found for: NA, K, CL, CO2, BUN, CREATININE, CALCIUM, ANIONGAP, ESTGFRAFRICA, EGFRNONAA  CMP  No results found for: NA, K, CL, CO2, GLU, BUN, CREATININE, CALCIUM, PROT, ALBUMIN, BILITOT, ALKPHOS, AST, ALT, ANIONGAP, ESTGFRAFRICA, EGFRNONAA  No results found for: CEA        Assessment:       1. Internal hemorrhoids    Subacutely thrombosed internal hemorrhoid.  Recently treated chlamydia proctitis    Plan:   Will treat conservatively with increased fiber and fluid intake, stool softeners as needed, and Analpram.  Safe sex practices discussed.  RTO if symptoms persist.    Laci De MD, FACS, FASCRS  Senior Staff Surgeon  Department of Colon & Rectal Surgery     This note was created using voice recognition software, and may contain some unrecognized transcriptional errors.

## 2019-11-27 DIAGNOSIS — R06.00 DYSPNEA, UNSPECIFIED TYPE: Primary | ICD-10-CM

## 2019-12-05 ENCOUNTER — HOSPITAL ENCOUNTER (OUTPATIENT)
Dept: RADIOLOGY | Facility: HOSPITAL | Age: 34
Discharge: HOME | End: 2019-12-05
Attending: INTERNAL MEDICINE
Payer: COMMERCIAL

## 2019-12-05 ENCOUNTER — OFFICE VISIT (OUTPATIENT)
Dept: PULMONOLOGY | Facility: CLINIC | Age: 34
End: 2019-12-05
Payer: COMMERCIAL

## 2019-12-05 VITALS
SYSTOLIC BLOOD PRESSURE: 106 MMHG | BODY MASS INDEX: 23.53 KG/M2 | DIASTOLIC BLOOD PRESSURE: 66 MMHG | HEIGHT: 72 IN | OXYGEN SATURATION: 98 % | HEART RATE: 82 BPM | WEIGHT: 173.75 LBS

## 2019-12-05 DIAGNOSIS — F17.200 SMOKER: Primary | ICD-10-CM

## 2019-12-05 DIAGNOSIS — R06.00 DYSPNEA, UNSPECIFIED TYPE: ICD-10-CM

## 2019-12-05 PROCEDURE — 99999 PR PBB SHADOW E&M-EST. PATIENT-LVL III: CPT | Mod: PBBFAC,,, | Performed by: INTERNAL MEDICINE

## 2019-12-05 PROCEDURE — 3008F PR BODY MASS INDEX (BMI) DOCUMENTED: ICD-10-PCS | Mod: CPTII,S$GLB,, | Performed by: INTERNAL MEDICINE

## 2019-12-05 PROCEDURE — 71046 X-RAY EXAM CHEST 2 VIEWS: CPT | Mod: 26,,, | Performed by: RADIOLOGY

## 2019-12-05 PROCEDURE — 99204 OFFICE O/P NEW MOD 45 MIN: CPT | Mod: S$GLB,,, | Performed by: INTERNAL MEDICINE

## 2019-12-05 PROCEDURE — 71046 XR CHEST PA AND LATERAL: ICD-10-PCS | Mod: 26,,, | Performed by: RADIOLOGY

## 2019-12-05 PROCEDURE — 3008F BODY MASS INDEX DOCD: CPT | Mod: CPTII,S$GLB,, | Performed by: INTERNAL MEDICINE

## 2019-12-05 PROCEDURE — 71046 X-RAY EXAM CHEST 2 VIEWS: CPT | Mod: TC,FY

## 2019-12-05 PROCEDURE — 99999 PR PBB SHADOW E&M-EST. PATIENT-LVL III: ICD-10-PCS | Mod: PBBFAC,,, | Performed by: INTERNAL MEDICINE

## 2019-12-05 PROCEDURE — 99204 PR OFFICE/OUTPT VISIT, NEW, LEVL IV, 45-59 MIN: ICD-10-PCS | Mod: S$GLB,,, | Performed by: INTERNAL MEDICINE

## 2019-12-07 NOTE — PROGRESS NOTES
"Subjective:      Patient ID: Neel Soliman is a 34 y.o. male.    Chief Complaint: Cough    HPI  35yo male who recently quit smoking and comes to "check" his lungs/ He had a cough that seems to resolved.He smoked up to 1 1/2 ppd       He is a graduate of [employer] and now lives in [facility] where he works in the [employer] business.  His chest x-ray is clear and his PFT's are normal. Sa02; 99% and unchanged after walking the length of the re[place] and back.      No flowsheet data found.  Review of Systems   Constitutional: Negative.    HENT: Negative.    Eyes: Negative.    Respiratory: Positive for cough.         Ex smoker   Cardiovascular: Negative.    Genitourinary: Negative.    Musculoskeletal: Negative.    Skin: Negative.    Gastrointestinal: Negative.    Neurological: Negative.    Psychiatric/Behavioral: Negative.      Objective:     Physical Exam   Constitutional: He is oriented to person, place, and time. He appears well-developed and well-nourished.   HENT:   Head: Normocephalic and atraumatic.   Right Ear: External ear normal.   Left Ear: External ear normal.   Eyes: Pupils are equal, round, and reactive to light. Conjunctivae and EOM are normal.   Neck: Normal range of motion. Neck supple.   Cardiovascular: Normal rate, regular rhythm and normal heart sounds.   Pulmonary/Chest: Effort normal and breath sounds normal.   Clear    Normal PFT's and Normal Sa02:99%    Chest x-ray is clear.   Abdominal: Soft. Bowel sounds are normal.   Musculoskeletal: Normal range of motion.   Neurological: He is alert and oriented to person, place, and time. He has normal reflexes.   Skin: Skin is warm and dry.   Psychiatric: He has a normal mood and affect. His behavior is normal. Judgment and thought content normal.       Assessment:     1. Smoker      Outpatient Encounter Medications as of 12/5/2019   Medication Sig Dispense Refill    emtricitabine-tenofovir, TDF, (TRUVADA) 100-150 mg Tab Take 1 tablet by mouth once daily. "       lamoTRIgine (LAMICTAL) 25 MG tablet Take 25 mg by mouth once daily.   2    LORazepam (ATIVAN) 2 MG Tab Take 1 mg by mouth every evening.   1     No facility-administered encounter medications on file as of 12/5/2019.        Plan:   Reassured him that he has no evidence of lung injury from smoking. And off he went to [facility].  Problem List Items Addressed This Visit     None      Visit Diagnoses     Smoker    -  Primary

## 2020-06-02 ENCOUNTER — LAB VISIT (OUTPATIENT)
Dept: PRIMARY CARE CLINIC | Facility: CLINIC | Age: 35
End: 2020-06-02
Payer: COMMERCIAL

## 2020-06-02 DIAGNOSIS — Z11.59 ENCOUNTER FOR SCREENING FOR OTHER VIRAL DISEASES: ICD-10-CM

## 2020-06-02 DIAGNOSIS — R05.9 COUGH: Primary | ICD-10-CM

## 2020-06-02 PROCEDURE — U0003 INFECTIOUS AGENT DETECTION BY NUCLEIC ACID (DNA OR RNA); SEVERE ACUTE RESPIRATORY SYNDROME CORONAVIRUS 2 (SARS-COV-2) (CORONAVIRUS DISEASE [COVID-19]), AMPLIFIED PROBE TECHNIQUE, MAKING USE OF HIGH THROUGHPUT TECHNOLOGIES AS DESCRIBED BY CMS-2020-01-R: HCPCS

## 2020-06-04 LAB — SARS-COV-2 RNA RESP QL NAA+PROBE: NOT DETECTED

## 2020-12-21 ENCOUNTER — PATIENT OUTREACH (OUTPATIENT)
Dept: ADMINISTRATIVE | Facility: HOSPITAL | Age: 35
End: 2020-12-21

## 2020-12-21 NOTE — PROGRESS NOTES
Patient due for the following    Lipid Panel     TETANUS VACCINE     Influenza Vaccine (1)        Patient new to provider.      Pre-visit chart review completed.  Immunizations: Links delayed  Care Everywhere: tigger  Care Teams: up to date  Outreach: omid

## 2021-04-16 ENCOUNTER — PATIENT MESSAGE (OUTPATIENT)
Dept: RESEARCH | Facility: HOSPITAL | Age: 36
End: 2021-04-16

## 2025-02-12 ENCOUNTER — TELEPHONE (OUTPATIENT)
Dept: CARDIOLOGY | Facility: CLINIC | Age: 40
End: 2025-02-12
Payer: COMMERCIAL

## 2025-04-04 ENCOUNTER — OFFICE VISIT (OUTPATIENT)
Dept: CARDIOLOGY | Facility: CLINIC | Age: 40
End: 2025-04-04
Payer: COMMERCIAL

## 2025-04-04 VITALS
HEART RATE: 90 BPM | WEIGHT: 150.38 LBS | SYSTOLIC BLOOD PRESSURE: 116 MMHG | BODY MASS INDEX: 20.39 KG/M2 | DIASTOLIC BLOOD PRESSURE: 79 MMHG

## 2025-04-04 DIAGNOSIS — R07.9 CHEST PAIN, UNSPECIFIED TYPE: ICD-10-CM

## 2025-04-04 DIAGNOSIS — R07.2 PRECORDIAL PAIN: Primary | ICD-10-CM

## 2025-04-04 DIAGNOSIS — R00.2 PALPITATIONS: ICD-10-CM

## 2025-04-04 LAB
OHS QRS DURATION: 96 MS
OHS QTC CALCULATION: 401 MS

## 2025-04-04 PROCEDURE — 99999 PR PBB SHADOW E&M-EST. PATIENT-LVL II: CPT | Mod: PBBFAC,,, | Performed by: INTERNAL MEDICINE

## 2025-04-04 RX ORDER — METOPROLOL TARTRATE 50 MG/1
TABLET ORAL
Qty: 2 TABLET | Refills: 0 | Status: SHIPPED | OUTPATIENT
Start: 2025-04-04

## 2025-04-04 RX ORDER — DEXTROAMPHETAMINE SACCHARATE, AMPHETAMINE ASPARTATE, DEXTROAMPHETAMINE SULFATE AND AMPHETAMINE SULFATE 2.5; 2.5; 2.5; 2.5 MG/1; MG/1; MG/1; MG/1
1 TABLET ORAL 2 TIMES DAILY
COMMUNITY

## 2025-04-04 NOTE — PROGRESS NOTES
Subjective:   04/04/2025     Patient ID:  Neel Soliman is a 39 y.o. male who presents for evaulation of [facility] Care (Chest pain)      Patient presents with feeling poorly for the last 6 months.  He has had chest tightness, worse with activity.  He has an increased heart rate with activity.  He has not been sleeping well.    He does not have a history of heart problems.  He smoked only in his youth.  He does not have a history of hypertension or diabetes.      He has had hair loss, has been given a prescription for minoxidil.      Family history negative for premature atherosclerotic.      No past medical history on file.    Review of patient's allergies indicates:  No Known Allergies    Current Medications[1]     Objective:   Review of Systems   Cardiovascular:  Positive for chest pain and dyspnea on exertion. Negative for claudication, cyanosis, irregular heartbeat, leg swelling, near-syncope, orthopnea, palpitations, paroxysmal nocturnal dyspnea and syncope.         Vitals:    04/04/25 0828   BP: 116/79   Pulse: 90     Wt Readings from Last 3 Encounters:   04/04/25 68.2 kg (150 lb 5.7 oz)   12/05/19 78.8 kg (173 lb 11.6 oz)   10/22/19 75.5 kg (166 lb 7.2 oz)     Temp Readings from Last 3 Encounters:   02/20/19 98.2 °F (36.8 °C) (Oral)   01/12/19 97.3 °F (36.3 °C)   12/28/18 97.1 °F (36.2 °C) (Oral)     BP Readings from Last 3 Encounters:   04/04/25 116/79   12/05/19 106/66   10/22/19 130/68     Pulse Readings from Last 3 Encounters:   04/04/25 90   12/05/19 82   10/22/19 94             Physical Exam  Vitals reviewed.   Constitutional:       General: He is not in acute distress.     Appearance: He is well-developed.   HENT:      Head: Normocephalic and atraumatic.      Nose: Nose normal.   Eyes:      Conjunctiva/sclera: Conjunctivae normal.      Pupils: Pupils are equal, round, and reactive to light.   Neck:      Vascular: No carotid bruit or JVD.   Cardiovascular:      Rate and Rhythm: Normal rate and regular  "rhythm.      Pulses: Normal pulses and intact distal pulses.      Heart sounds: Normal heart sounds. No murmur heard.     No friction rub. No gallop.   Pulmonary:      Effort: Pulmonary effort is normal. No respiratory distress.      Breath sounds: Normal breath sounds. No wheezing or rales.   Chest:      Chest wall: No tenderness.   Abdominal:      General: Bowel sounds are normal. There is no distension.      Palpations: Abdomen is soft.      Tenderness: There is no abdominal tenderness.   Musculoskeletal:         General: No tenderness or deformity. Normal range of motion.      Cervical back: Normal range of motion and neck supple.      Right lower leg: No edema.      Left lower leg: No edema.   Skin:     General: Skin is warm and dry.      Findings: No erythema or rash.   Neurological:      Mental Status: He is alert and oriented to person, place, and time.      Cranial Nerves: No cranial nerve deficit.      Motor: No abnormal muscle tone.      Coordination: Coordination normal.   Psychiatric:         Behavior: Behavior normal.         Thought Content: Thought content normal.         Judgment: Judgment normal.         No results found for: "CHOL"  No results found for: "HDL"  No results found for: "LDLCALC"  Lab Results   Component Value Date    ALT 16 01/06/2025    AST 14 01/06/2025    AST 16 11/05/2024     Lab Results   Component Value Date    CREATININE 1.15 01/06/2025    BUN 12 01/06/2025     01/06/2025    K 4.5 01/06/2025    CO2 32 01/06/2025    CO2 25 11/06/2024    CO2 29 11/05/2024     Lab Results   Component Value Date    HGB 13.8 11/06/2024    HCT 41.2 11/06/2024                         Assessment and Plan:     Precordial pain  -     IN OFFICE EKG 12-LEAD (to [place])  -     CTA Cardiac; Future; Expected date: 04/04/2025  -     metoprolol tartrate (LOPRESSOR) 50 MG tablet; Take 2 tablets 1 hour prior to CT coronary angiogram  Dispense: 2 tablet; Refill: 0  -     CBC Auto Differential; Future; " Expected date: 04/11/2025  -     Basic Metabolic Panel; Future; Expected date: 04/11/2025  -     Lipid Panel; Future; Expected date: 04/11/2025    Chest pain, unspecified type    Palpitations  -     TSH; Future; Expected date: 04/04/2025         No follow-ups on file.          No future appointments.                 [1]   Current Outpatient Medications:     dextroamphetamine-amphetamine (ADDERALL) 10 mg Tab, Take 1 tablet by mouth 2 (two) times a day. 1-2 times daily, Disp: , Rfl:     emtricitabine-tenofovir, TDF, (TRUVADA) 100-150 mg Tab, Take 1 tablet by mouth once daily.  (Patient not taking: Reported on 4/4/2025), Disp: , Rfl:     lamoTRIgine (LAMICTAL) 25 MG tablet, Take 25 mg by mouth once daily.  (Patient not taking: Reported on 4/4/2025), Disp: , Rfl: 2    LORazepam (ATIVAN) 2 MG Tab, Take 1 mg by mouth every evening.  (Patient not taking: Reported on 4/4/2025), Disp: , Rfl: 1    metoprolol tartrate (LOPRESSOR) 50 MG tablet, Take 2 tablets 1 hour prior to CT coronary angiogram, Disp: 2 tablet, Rfl: 0

## 2025-04-28 ENCOUNTER — PATIENT MESSAGE (OUTPATIENT)
Dept: CARDIOLOGY | Facility: HOSPITAL | Age: 40
End: 2025-04-28
Payer: COMMERCIAL

## 2025-04-28 ENCOUNTER — TELEPHONE (OUTPATIENT)
Dept: CARDIOLOGY | Facility: HOSPITAL | Age: 40
End: 2025-04-28
Payer: COMMERCIAL

## 2025-04-28 NOTE — TELEPHONE ENCOUNTER
I had the pleasure of discussing your upcoming Cardiac CTA scheduled for 05/01/2025 at 1:00. To review, we discussed showing up 15-20 minutes before your appointment time. Your test is located at [facility] on the corner of [address], address [address], [place] [address], next door to [employer].     I have reviewed your current medications that you take and I've discussed the Cardiac CTA prep for heart rate management with Dr. Austin, the interpreting MD. He agrees with the medication prescribed to you by Dr. Cross, which is to take 100mg of Metoprolol tartrate (Lopressor) 2-hours prior to the CTA. Again, the goal for this is to maintain a desired heart rate of ~60 beats/minute for the duration of the test--about 40 minutes. This helps for the overall study quality and clarity.    Please ensure to HOLD your Dextroamphetamine-amphetamine (Adderall) beginning tomorrow and Wednesday. BRING a dose with you to the CTA so if in the event it is needed, it can be taken upon completion of the imaging.    We did discuss needing lab work completed prior to the test, this can be done as a walk-in with no appointment necessary whenever you arrive back from your trip at the [place] facility.    Reminder for a 4-hour fasting time, no caffeine the AM of, and you can have water, anywhere from 16-32 ounces before and after completion of the test. It is advisable to have someone transport you to and from the test as a safety precaution. Thank you again for your time today. For any questions or concerns: I am available M-F from 7:30-4, please call [phone].

## 2025-05-01 ENCOUNTER — HOSPITAL ENCOUNTER (OUTPATIENT)
Dept: RADIOLOGY | Facility: HOSPITAL | Age: 40
Discharge: HOME | End: 2025-05-01
Attending: INTERNAL MEDICINE
Payer: COMMERCIAL

## 2025-05-01 VITALS
DIASTOLIC BLOOD PRESSURE: 66 MMHG | SYSTOLIC BLOOD PRESSURE: 110 MMHG | OXYGEN SATURATION: 100 % | RESPIRATION RATE: 14 BRPM | HEART RATE: 71 BPM

## 2025-05-01 DIAGNOSIS — R07.2 PRECORDIAL PAIN: ICD-10-CM

## 2025-05-01 PROCEDURE — 75574 CT ANGIO HRT W/3D IMAGE: CPT | Mod: 26,,, | Performed by: STUDENT IN AN ORGANIZED HEALTH CARE EDUCATION/TRAINING PROGRAM

## 2025-05-01 PROCEDURE — 25500020 PHARM REV CODE 255: Performed by: INTERNAL MEDICINE

## 2025-05-01 PROCEDURE — 75574 CT ANGIO HRT W/3D IMAGE: CPT | Mod: TC

## 2025-05-01 RX ORDER — PROPRANOLOL HYDROCHLORIDE 10 MG/1
10 TABLET ORAL EVERY 6 HOURS PRN
Qty: 30 TABLET | Refills: 11 | Status: SHIPPED | OUTPATIENT
Start: 2025-05-01 | End: 2026-05-01

## 2025-05-01 RX ORDER — METOPROLOL TARTRATE 1 MG/ML
5 INJECTION, SOLUTION INTRAVENOUS EVERY 5 MIN PRN
Status: DISCONTINUED | OUTPATIENT
Start: 2025-05-01 | End: 2025-05-02 | Stop reason: HOSPADM

## 2025-05-01 RX ORDER — NITROGLYCERIN 0.4 MG/1
0.4 TABLET SUBLINGUAL ONCE
Status: DISCONTINUED | OUTPATIENT
Start: 2025-05-01 | End: 2025-05-02 | Stop reason: HOSPADM

## 2025-05-01 RX ADMIN — IOHEXOL 100 ML: 350 INJECTION, SOLUTION INTRAVENOUS at 01:05

## 2025-05-01 NOTE — NURSING
Pt VSS post CTA study. Pt exhibiting no adverse effects from medication. Pt able to adequately ambulate with no dizziness or SOB. IV removed, site dressed with guaze and coban. [facility]. Pt educated on post study instructions. Verbalized understanding.

## 2025-05-01 NOTE — NURSING
[facility]  Pt AAOx4, ambulatory, no distress noted. Pt informed of procedure, need for PIV and side effects of contrast and nitroglycerin.  Pt connected for continuous vital sign monitoring. VS assessed and put in flowsheets.  IV in place.  Pt verbalizes understanding.

## 2025-07-05 ENCOUNTER — HOSPITAL ENCOUNTER (INPATIENT)
Facility: HOSPITAL | Age: 40
LOS: 4 days | Discharge: HOME | DRG: 641 | End: 2025-07-09
Attending: EMERGENCY MEDICINE | Admitting: STUDENT IN AN ORGANIZED HEALTH CARE EDUCATION/TRAINING PROGRAM
Payer: COMMERCIAL

## 2025-07-05 DIAGNOSIS — R07.9 CHEST PAIN: ICD-10-CM

## 2025-07-05 DIAGNOSIS — R19.7 DIARRHEA: ICD-10-CM

## 2025-07-05 DIAGNOSIS — Z13.6 SCREENING FOR CARDIOVASCULAR CONDITION: ICD-10-CM

## 2025-07-05 DIAGNOSIS — R19.7 DIARRHEA, UNSPECIFIED TYPE: Primary | ICD-10-CM

## 2025-07-05 PROBLEM — E86.0 DEHYDRATION: Status: ACTIVE | Noted: 2025-07-05

## 2025-07-05 PROBLEM — A41.9 SEPSIS: Status: RESOLVED | Noted: 2025-07-05 | Resolved: 2025-07-05

## 2025-07-05 PROBLEM — A41.9 SEPSIS: Status: ACTIVE | Noted: 2025-07-05

## 2025-07-05 LAB
ABSOLUTE EOSINOPHIL (OHS): 0.04 K/UL
ABSOLUTE MONOCYTE (OHS): 0.56 K/UL (ref 0.3–1)
ABSOLUTE NEUTROPHIL COUNT (OHS): 7.56 K/UL (ref 1.8–7.7)
ALBUMIN SERPL BCP-MCNC: 4 G/DL (ref 3.5–5.2)
ALP SERPL-CCNC: 62 UNIT/L (ref 40–150)
ALT SERPL W/O P-5'-P-CCNC: 28 UNIT/L (ref 10–44)
ANION GAP (OHS): 12 MMOL/L (ref 8–16)
AST SERPL-CCNC: 17 UNIT/L (ref 11–45)
BACTERIA #/AREA URNS AUTO: NORMAL /HPF
BASOPHILS # BLD AUTO: 0.04 K/UL
BASOPHILS NFR BLD AUTO: 0.5 %
BILIRUB SERPL-MCNC: 1 MG/DL (ref 0.1–1)
BILIRUB UR QL STRIP.AUTO: NEGATIVE
BIPAP: 0
BIPAP: 0
BUN SERPL-MCNC: 15 MG/DL (ref 6–20)
CALCIUM SERPL-MCNC: 8.8 MG/DL (ref 8.7–10.5)
CHLORIDE SERPL-SCNC: 107 MMOL/L (ref 95–110)
CLARITY UR: CLEAR
CO2 SERPL-SCNC: 19 MMOL/L (ref 23–29)
COLOR UR AUTO: YELLOW
CORRECTED TEMPERATURE (PCO2): 38.6 MMHG
CORRECTED TEMPERATURE (PH): 7.38
CORRECTED TEMPERATURE (PO2): 49.1 MMHG
CREAT SERPL-MCNC: 1.1 MG/DL (ref 0.5–1.4)
ERYTHROCYTE [DISTWIDTH] IN BLOOD BY AUTOMATED COUNT: 12.7 % (ref 11.5–14.5)
FIO2: 21 %
FIO2: 21 %
GFR SERPLBLD CREATININE-BSD FMLA CKD-EPI: >60 ML/MIN/1.73/M2
GLUCOSE SERPL-MCNC: 116 MG/DL (ref 70–110)
GLUCOSE UR QL STRIP: NEGATIVE
HCT VFR BLD AUTO: 44.7 % (ref 40–54)
HCV AB SERPL QL IA: NORMAL
HGB BLD-MCNC: 15.3 GM/DL (ref 14–18)
HGB UR QL STRIP: NEGATIVE
HIV 1+2 AB+HIV1 P24 AG SERPL QL IA: NORMAL
HYALINE CASTS UR QL AUTO: 0 /LPF (ref 0–1)
IMM GRANULOCYTES # BLD AUTO: 0.07 K/UL (ref 0–0.04)
IMM GRANULOCYTES NFR BLD AUTO: 0.8 % (ref 0–0.5)
INFLUENZA A MOLECULAR (OHS): NEGATIVE
INFLUENZA B MOLECULAR (OHS): NEGATIVE
KETONES UR QL STRIP: NEGATIVE
LDH SERPL L TO P-CCNC: 2.1 MMOL/L (ref 0.5–2.2)
LDH SERPL L TO P-CCNC: 2.4 MMOL/L (ref 0.5–2.2)
LEUKOCYTE ESTERASE UR QL STRIP: NEGATIVE
LYMPHOCYTES # BLD AUTO: 0.41 K/UL (ref 1–4.8)
MAGNESIUM SERPL-MCNC: 1.6 MG/DL (ref 1.6–2.6)
MCH RBC QN AUTO: 29.4 PG (ref 27–31)
MCHC RBC AUTO-ENTMCNC: 34.2 G/DL (ref 32–36)
MCV RBC AUTO: 86 FL (ref 82–98)
MICROSCOPIC COMMENT: NORMAL
NITRITE UR QL STRIP: NEGATIVE
NUCLEATED RBC (/100WBC) (OHS): 0 /100 WBC
PCO2 BLDA: 38.6 MMHG (ref 35–45)
PH SMN: 7.38 [PH] (ref 7.35–7.45)
PH UR STRIP: 7 [PH]
PLATELET # BLD AUTO: 174 K/UL (ref 150–450)
PMV BLD AUTO: 9.5 FL (ref 9.2–12.9)
PO2 BLDA: 49.1 MMHG (ref 40–60)
POC PERFORMED BY: ABNORMAL
POC PERFORMED BY: NORMAL
POC TEMPERATURE: 37 C
POC TEMPERATURE: 37 C
POCT GLUCOSE: 121 MG/DL (ref 70–110)
POTASSIUM SERPL-SCNC: 3.6 MMOL/L (ref 3.5–5.1)
PROT SERPL-MCNC: 6.7 GM/DL (ref 6–8.4)
PROT UR QL STRIP: ABNORMAL
RBC # BLD AUTO: 5.21 M/UL (ref 4.6–6.2)
RBC #/AREA URNS AUTO: 1 /HPF (ref 0–4)
RELATIVE EOSINOPHIL (OHS): 0.5 %
RELATIVE LYMPHOCYTE (OHS): 4.7 % (ref 18–48)
RELATIVE MONOCYTE (OHS): 6.5 % (ref 4–15)
RELATIVE NEUTROPHIL (OHS): 87 % (ref 38–73)
SARS-COV-2 RDRP RESP QL NAA+PROBE: NEGATIVE
SODIUM SERPL-SCNC: 138 MMOL/L (ref 136–145)
SP GR UR STRIP: >=1.03
SPECIMEN SOURCE: ABNORMAL
SPECIMEN SOURCE: NORMAL
UROBILINOGEN UR STRIP-ACNC: NEGATIVE EU/DL
WBC # BLD AUTO: 8.68 K/UL (ref 3.9–12.7)
WBC #/AREA URNS AUTO: <1 /HPF (ref 0–5)
YEAST UR QL AUTO: NORMAL /HPF

## 2025-07-05 PROCEDURE — 25000003 PHARM REV CODE 250: Performed by: EMERGENCY MEDICINE

## 2025-07-05 PROCEDURE — 87427 SHIGA-LIKE TOXIN AG IA: CPT | Performed by: STUDENT IN AN ORGANIZED HEALTH CARE EDUCATION/TRAINING PROGRAM

## 2025-07-05 PROCEDURE — 99900035 HC TECH TIME PER 15 MIN (STAT)

## 2025-07-05 PROCEDURE — 12000002 HC ACUTE/MED SURGE SEMI-PRIVATE ROOM

## 2025-07-05 PROCEDURE — 85025 COMPLETE CBC W/AUTO DIFF WBC: CPT

## 2025-07-05 PROCEDURE — 82962 GLUCOSE BLOOD TEST: CPT

## 2025-07-05 PROCEDURE — 87046 STOOL CULTR AEROBIC BACT EA: CPT | Performed by: STUDENT IN AN ORGANIZED HEALTH CARE EDUCATION/TRAINING PROGRAM

## 2025-07-05 PROCEDURE — 63600175 PHARM REV CODE 636 W HCPCS: Performed by: STUDENT IN AN ORGANIZED HEALTH CARE EDUCATION/TRAINING PROGRAM

## 2025-07-05 PROCEDURE — 82653 EL-1 FECAL QUANTITATIVE: CPT

## 2025-07-05 PROCEDURE — 99285 EMERGENCY DEPT VISIT HI MDM: CPT | Mod: 25

## 2025-07-05 PROCEDURE — 96365 THER/PROPH/DIAG IV INF INIT: CPT

## 2025-07-05 PROCEDURE — 25000003 PHARM REV CODE 250

## 2025-07-05 PROCEDURE — 96376 TX/PRO/DX INJ SAME DRUG ADON: CPT

## 2025-07-05 PROCEDURE — 96366 THER/PROPH/DIAG IV INF ADDON: CPT

## 2025-07-05 PROCEDURE — 82803 BLOOD GASES ANY COMBINATION: CPT

## 2025-07-05 PROCEDURE — 63600175 PHARM REV CODE 636 W HCPCS

## 2025-07-05 PROCEDURE — 86803 HEPATITIS C AB TEST: CPT | Performed by: PHYSICIAN ASSISTANT

## 2025-07-05 PROCEDURE — 63600175 PHARM REV CODE 636 W HCPCS: Performed by: EMERGENCY MEDICINE

## 2025-07-05 PROCEDURE — 83605 ASSAY OF LACTIC ACID: CPT

## 2025-07-05 PROCEDURE — 25500020 PHARM REV CODE 255: Performed by: EMERGENCY MEDICINE

## 2025-07-05 PROCEDURE — 87389 HIV-1 AG W/HIV-1&-2 AB AG IA: CPT | Performed by: PHYSICIAN ASSISTANT

## 2025-07-05 PROCEDURE — 89055 LEUKOCYTE ASSESSMENT FECAL: CPT | Performed by: STUDENT IN AN ORGANIZED HEALTH CARE EDUCATION/TRAINING PROGRAM

## 2025-07-05 PROCEDURE — U0002 COVID-19 LAB TEST NON-CDC: HCPCS | Performed by: EMERGENCY MEDICINE

## 2025-07-05 PROCEDURE — 96375 TX/PRO/DX INJ NEW DRUG ADDON: CPT

## 2025-07-05 PROCEDURE — 87328 CRYPTOSPORIDIUM AG IA: CPT | Performed by: STUDENT IN AN ORGANIZED HEALTH CARE EDUCATION/TRAINING PROGRAM

## 2025-07-05 PROCEDURE — 81001 URINALYSIS AUTO W/SCOPE: CPT

## 2025-07-05 PROCEDURE — 93005 ELECTROCARDIOGRAM TRACING: CPT

## 2025-07-05 PROCEDURE — 87045 FECES CULTURE AEROBIC BACT: CPT | Performed by: STUDENT IN AN ORGANIZED HEALTH CARE EDUCATION/TRAINING PROGRAM

## 2025-07-05 PROCEDURE — 87502 INFLUENZA DNA AMP PROBE: CPT

## 2025-07-05 PROCEDURE — 93010 ELECTROCARDIOGRAM REPORT: CPT | Mod: ,,, | Performed by: INTERNAL MEDICINE

## 2025-07-05 PROCEDURE — 87040 BLOOD CULTURE FOR BACTERIA: CPT

## 2025-07-05 PROCEDURE — 87449 NOS EACH ORGANISM AG IA: CPT | Performed by: STUDENT IN AN ORGANIZED HEALTH CARE EDUCATION/TRAINING PROGRAM

## 2025-07-05 PROCEDURE — 87425 ROTAVIRUS AG IA: CPT | Performed by: STUDENT IN AN ORGANIZED HEALTH CARE EDUCATION/TRAINING PROGRAM

## 2025-07-05 PROCEDURE — 83993 ASSAY FOR CALPROTECTIN FECAL: CPT

## 2025-07-05 PROCEDURE — 80053 COMPREHEN METABOLIC PANEL: CPT

## 2025-07-05 PROCEDURE — 96367 TX/PROPH/DG ADDL SEQ IV INF: CPT

## 2025-07-05 PROCEDURE — 96361 HYDRATE IV INFUSION ADD-ON: CPT

## 2025-07-05 PROCEDURE — 83735 ASSAY OF MAGNESIUM: CPT

## 2025-07-05 RX ORDER — GLUCAGON 1 MG
1 KIT INJECTION
Status: DISCONTINUED | OUTPATIENT
Start: 2025-07-05 | End: 2025-07-09 | Stop reason: HOSPADM

## 2025-07-05 RX ORDER — IBUPROFEN 200 MG
24 TABLET ORAL
Status: DISCONTINUED | OUTPATIENT
Start: 2025-07-05 | End: 2025-07-09 | Stop reason: HOSPADM

## 2025-07-05 RX ORDER — ALUMINUM HYDROXIDE, MAGNESIUM HYDROXIDE, AND SIMETHICONE 1200; 120; 1200 MG/30ML; MG/30ML; MG/30ML
30 SUSPENSION ORAL ONCE
Status: COMPLETED | OUTPATIENT
Start: 2025-07-05 | End: 2025-07-05

## 2025-07-05 RX ORDER — VANCOMYCIN 1.75 GRAM/500 ML IN 0.9 % SODIUM CHLORIDE INTRAVENOUS
25 ONCE
Status: COMPLETED | OUTPATIENT
Start: 2025-07-05 | End: 2025-07-05

## 2025-07-05 RX ORDER — KETOROLAC TROMETHAMINE 30 MG/ML
10 INJECTION, SOLUTION INTRAMUSCULAR; INTRAVENOUS
Status: COMPLETED | OUTPATIENT
Start: 2025-07-05 | End: 2025-07-05

## 2025-07-05 RX ORDER — IBUPROFEN 200 MG
16 TABLET ORAL
Status: DISCONTINUED | OUTPATIENT
Start: 2025-07-05 | End: 2025-07-09 | Stop reason: HOSPADM

## 2025-07-05 RX ORDER — ACETAMINOPHEN 500 MG
1000 TABLET ORAL
Status: COMPLETED | OUTPATIENT
Start: 2025-07-05 | End: 2025-07-05

## 2025-07-05 RX ORDER — LIDOCAINE HYDROCHLORIDE 20 MG/ML
15 SOLUTION OROPHARYNGEAL ONCE
Status: COMPLETED | OUTPATIENT
Start: 2025-07-05 | End: 2025-07-05

## 2025-07-05 RX ORDER — MORPHINE SULFATE 4 MG/ML
4 INJECTION, SOLUTION INTRAMUSCULAR; INTRAVENOUS
Refills: 0 | Status: COMPLETED | OUTPATIENT
Start: 2025-07-05 | End: 2025-07-05

## 2025-07-05 RX ORDER — SODIUM CHLORIDE 0.9 % (FLUSH) 0.9 %
10 SYRINGE (ML) INJECTION EVERY 12 HOURS PRN
Status: DISCONTINUED | OUTPATIENT
Start: 2025-07-05 | End: 2025-07-09 | Stop reason: HOSPADM

## 2025-07-05 RX ORDER — NALOXONE HCL 0.4 MG/ML
0.02 VIAL (ML) INJECTION
Status: DISCONTINUED | OUTPATIENT
Start: 2025-07-05 | End: 2025-07-09 | Stop reason: HOSPADM

## 2025-07-05 RX ORDER — ACETAMINOPHEN 325 MG/1
650 TABLET ORAL EVERY 6 HOURS PRN
Status: DISCONTINUED | OUTPATIENT
Start: 2025-07-05 | End: 2025-07-09 | Stop reason: HOSPADM

## 2025-07-05 RX ADMIN — KETOROLAC TROMETHAMINE 10 MG: 30 INJECTION, SOLUTION INTRAMUSCULAR; INTRAVENOUS at 02:07

## 2025-07-05 RX ADMIN — LIDOCAINE HYDROCHLORIDE 15 ML: 20 SOLUTION ORAL at 04:07

## 2025-07-05 RX ADMIN — SODIUM CHLORIDE, POTASSIUM CHLORIDE, SODIUM LACTATE AND CALCIUM CHLORIDE 1000 ML: 600; 310; 30; 20 INJECTION, SOLUTION INTRAVENOUS at 08:07

## 2025-07-05 RX ADMIN — MORPHINE SULFATE 4 MG: 4 INJECTION INTRAVENOUS at 03:07

## 2025-07-05 RX ADMIN — SODIUM CHLORIDE 1750 MG: 9 INJECTION, SOLUTION INTRAVENOUS at 02:07

## 2025-07-05 RX ADMIN — SODIUM CHLORIDE, POTASSIUM CHLORIDE, SODIUM LACTATE AND CALCIUM CHLORIDE 1500 ML: 600; 310; 30; 20 INJECTION, SOLUTION INTRAVENOUS at 02:07

## 2025-07-05 RX ADMIN — SODIUM CHLORIDE, POTASSIUM CHLORIDE, SODIUM LACTATE AND CALCIUM CHLORIDE 1000 ML: 600; 310; 30; 20 INJECTION, SOLUTION INTRAVENOUS at 01:07

## 2025-07-05 RX ADMIN — ACETAMINOPHEN 1000 MG: 500 TABLET ORAL at 02:07

## 2025-07-05 RX ADMIN — IOHEXOL 100 ML: 350 INJECTION, SOLUTION INTRAVENOUS at 03:07

## 2025-07-05 RX ADMIN — PIPERACILLIN SODIUM AND TAZOBACTAM SODIUM 4.5 G: 4; .5 INJECTION, POWDER, LYOPHILIZED, FOR SOLUTION INTRAVENOUS at 08:07

## 2025-07-05 RX ADMIN — ACETAMINOPHEN 650 MG: 325 TABLET ORAL at 09:07

## 2025-07-05 RX ADMIN — ALUMINUM HYDROXIDE, MAGNESIUM HYDROXIDE, AND SIMETHICONE 30 ML: 200; 200; 20 SUSPENSION ORAL at 04:07

## 2025-07-05 RX ADMIN — PIPERACILLIN SODIUM AND TAZOBACTAM SODIUM 4.5 G: 4; .5 INJECTION, POWDER, FOR SOLUTION INTRAVENOUS at 01:07

## 2025-07-05 RX ADMIN — MORPHINE SULFATE 4 MG: 4 INJECTION INTRAVENOUS at 01:07

## 2025-07-05 NOTE — PROGRESS NOTES
"Pharmacokinetic Initial Assessment: IV Vancomycin    Assessment/Plan:    Initiate intravenous vancomycin with loading dose of 1750 mg once followed by a maintenance dose of vancomycin 1000 mg IV every 12 hours  Desired empiric serum trough concentration is 15 to 20 mcg/mL  Draw vancomycin trough level 60 min prior to fourth dose on Monday 7/7/2025 at approximately 0200  Pharmacy will continue to follow and monitor vancomycin.      Please contact pharmacy with any questions regarding this assessment.     Thank you for the consult,   Dale Gayle       Patient brief summary:  Neel Soliman is a 39 y.o. male initiated on antimicrobial therapy with IV Vancomycin for treatment of suspected sepsis    Drug Allergies:   Review of patient's allergies indicates:  No Known Allergies    Actual Body Weight:   70.3 kg    Renal Function:   Estimated Creatinine Clearance: 90.5 mL/min (based on SCr of 1.1 mg/dL).,     Dialysis Method (if applicable):  N/A    CBC (last 72 hours):  Recent Labs   Lab Result Units 07/05/25  1307   WBC K/uL 8.68   HGB gm/dL 15.3   HCT % 44.7   Platelet Count K/uL 174   Lymph % % 4.7*   Mono % % 6.5   Eos % % 0.5   Basophil % % 0.5       Metabolic Panel (last 72 hours):  Recent Labs   Lab Result Units 07/05/25  1307   Sodium mmol/L 138   Potassium mmol/L 3.6   Chloride mmol/L 107   CO2 mmol/L 19*   Glucose mg/dL 116*   BUN mg/dL 15   Creatinine mg/dL 1.1   Albumin g/dL 4.0   Bilirubin Total mg/dL 1.0   ALP unit/L 62   AST unit/L 17   ALT unit/L 28   Magnesium  mg/dL 1.6       Drug levels (last 3 results):  No results for input(s): "VANCOMYCINRA", "VANCORANDOM", "VANCOMYCINPE", "VANCOPEAK", "VANCOMYCINTR", "VANCOTROUGH" in the last 72 hours.    Microbiologic Results:  Microbiology Results (last 7 days)       Procedure Component Value Units Date/Time    Clostridium difficile EIA [[id number]]     Order Status: Sent Specimen: Stool     Stool culture [[id number]]     Order Status: Sent Specimen: Stool     " Influenza A & B by Molecular [[id number]]  (Normal) Collected: 07/05/25 1307    Order Status: Completed Specimen: Nasal Swab Updated: 07/05/25 1401     INFLUENZA A MOLECULAR Negative     INFLUENZA B MOLECULAR  Negative    Blood culture x two cultures. Draw prior to antibiotics. [[id number]] Collected: 07/05/25 1316    Order Status: Resulted Specimen: Blood from Peripheral, Antecubital, Right Updated: 07/05/25 1359    Blood culture x two cultures. Draw prior to antibiotics. [[id number]] Collected: 07/05/25 1307    Order Status: Resulted Specimen: Blood from Peripheral, Antecubital, Left Updated: 07/05/25 1358

## 2025-07-05 NOTE — HPI
Patient is a 38 y/o WM presenting for acute onset abdominal pain, fever, and diarrheal illness. Most of the history is provided by his mother, who is accompanying him. He began having diarrhea on 7/4 with approximately 12 loose bowel movements at home. He was brought in by his mother and father who were concerned about his illness. His mother reports several instances of similar diarrheal illnesses in the past, some of which have required hospitalization. She reports that the disturbances can sometimes coincide with spicy food but are oftentimes random. She has a personal history of pancreatic insufficiency and requires dietary enzyme supplementation. She says that he was working all day prior to the onset of the symptoms, and has been working outside in a fruit garden recently. He has an extensive travel history, and has recently returned from two long-distance flights within the continental [place]. He frequently travels back and forth between [place] and a residence in [facility]. He has taken the same pancreatic supplements as his mother in the past and she reports mixed improvement in his symptoms. He has no pets currently. 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

## 2025-07-05 NOTE — H&P
[facility] - Emergency Dept  [facility] Medicine  History & Physical    Patient Name: Neel Soliman  MRN: [id number]  Patient Class: IP- Inpatient  Admission Date: 7/5/2025  Attending Physician: Eren Smiley MD   Primary Care Provider: Lakshmi Callahan MD         Patient information was obtained from relative(s) and ER records.     Subjective:     Principal Problem:<principal problem not specified>    Chief Complaint:   Chief Complaint   Patient presents with    Fever    Diarrhea     Diarrhea started 12 hrs ago and temp 102, took tylenol at 0930, no antibiotics recently        HPI: Patient is a 40 y/o WM presenting for acute onset abdominal pain, fever, and diarrheal illness. Most of the history is provided by his mother, who is accompanying him. He began having diarrhea on 7/4 with approximately 12 loose bowel movements at home. He was brought in by his mother and father who were concerned about his illness. His mother reports several instances of similar diarrheal illnesses in the past, some of which have required hospitalization. She reports that the disturbances can sometimes coincide with spicy food but are oftentimes random. She has a personal history of pancreatic insufficiency and requires dietary enzyme supplementation. She says that he was working all day prior to the onset of the symptoms, and has been working outside in a fruit garden recently. He has an extensive travel history, and has recently returned from two long-distance flights within the continental [place]. He frequently travels back and forth between [place] and a residence in [facility]. He has taken the same pancreatic supplements as his mother in the past and she reports mixed improvement in his symptoms. He has no pets currently. 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    History reviewed. No pertinent past medical history.    History reviewed. No pertinent surgical history.    Review of  patient's allergies indicates:  No Known Allergies    No current facility-administered medications on file prior to encounter.     Current Outpatient Medications on File Prior to Encounter   Medication Sig    dextroamphetamine-amphetamine (ADDERALL) 10 mg Tab Take 1 tablet by mouth 2 (two) times a day. 1-2 times daily    emtricitabine-tenofovir, TDF, (TRUVADA) 100-150 mg Tab Take 1 tablet by mouth once daily.  (Patient not taking: Reported on 4/4/2025)    lamoTRIgine (LAMICTAL) 25 MG tablet Take 25 mg by mouth once daily.  (Patient not taking: Reported on 4/4/2025)    LORazepam (ATIVAN) 2 MG Tab Take 1 mg by mouth every evening.  (Patient not taking: Reported on 4/4/2025)    propranoloL (INDERAL) 10 MG tablet Take 1 tablet (10 mg total) by mouth every 6 (six) hours as needed (palpitations).     Family History       Problem Relation (Age of Onset)    Heart attack Maternal Grandfather    Hyperlipidemia Mother, Father          Tobacco Use    Smoking status: Former     Current packs/day: 1.00     Average packs/day: 1 pack/day for 3.0 years (3.0 ttl pk-yrs)     Types: Cigarettes    Smokeless tobacco: Never   Substance and Sexual Activity    Alcohol use: No    Drug use: No    Sexual activity: Yes     Partners: Male     Review of Systems   Constitutional:  Positive for appetite change, diaphoresis, fever and unexpected weight change.        20 lbs wl over 4 months. Positive fever/chills. Decreased oral intake   HENT: Negative.     Eyes: Negative.    Respiratory: Negative.     Cardiovascular: Negative.    Gastrointestinal:  Positive for abdominal pain and diarrhea. Negative for nausea and vomiting.   Endocrine: Negative.    Genitourinary:  Positive for decreased urine volume and dysuria.   Musculoskeletal: Negative.    Skin:  Positive for pallor. Negative for rash.   Allergic/Immunologic: Negative.    Neurological:  Positive for weakness and light-headedness.   Hematological: Negative.    Psychiatric/Behavioral: Negative.        Objective:     Vital Signs (Most Recent):  Temp: 99.7 °F (37.6 °C) (07/05/25 1459)  Pulse: (!) 114 (07/05/25 1800)  Resp: 18 (07/05/25 1800)  BP: 109/65 (07/05/25 1800)  SpO2: 99 % (07/05/25 1800) Vital Signs (24h Range):  Temp:  [99.7 °F (37.6 °C)-100.9 °F (38.3 °C)] 99.7 °F (37.6 °C)  Pulse:  [106-119] 114  Resp:  [15-22] 18  SpO2:  [96 %-100 %] 99 %  BP: ()/(60-73) 109/65     Weight: 70.3 kg (155 lb)  Body mass index is 21.62 kg/m².     Physical Exam  Constitutional:       General: He is in acute distress.      Appearance: He is ill-appearing.      Comments: Patient had visible drowsiness and malaise   HENT:      Head: Normocephalic and atraumatic.      Nose: Nose normal.      Mouth/Throat:      Mouth: Mucous membranes are dry.      Pharynx: Oropharynx is clear.   Eyes:      Extraocular Movements: Extraocular movements intact.      Conjunctiva/sclera: Conjunctivae normal.      Pupils: Pupils are equal, round, and reactive to light.   Cardiovascular:      Rate and Rhythm: Regular rhythm. Tachycardia present.      Pulses: Normal pulses.      Heart sounds: Normal heart sounds.      Comments: Sinus tach  Pulmonary:      Effort: Pulmonary effort is normal.      Breath sounds: Normal breath sounds.   Abdominal:      General: Abdomen is flat.      Palpations: Abdomen is soft.      Tenderness: There is abdominal tenderness. There is no guarding or rebound.      Comments: R>L abdominal tenderness   Musculoskeletal:         General: Normal range of motion.      Cervical back: Normal range of motion and neck supple.   Skin:     General: Skin is warm and dry.      Coloration: Skin is pale.   Neurological:      Sensory: No sensory deficit.      Motor: Weakness present.      Comments: Visibly exhausted but not confused or disoriented              CRANIAL NERVES     CN III, IV, VI   Pupils are equal, round, and reactive to light.       Significant Labs: All pertinent labs within the past 24 hours have been  reviewed.    Significant Imaging: I have reviewed all pertinent imaging results/findings within the past 24 hours.  Assessment/Plan:     Assessment & Plan  Diarrhea  Dehydration  Diarrheal illness complicated by tachycardia, fever, and dehydration.  - Vanc/Zosyn initated due to sepsis protocol  - C diff/Rotavirus/Crypto/Giardia ordered  - Aggressive IV/Po rehydration therapy initiated  - Monitor for electrolyte derangements and signs of end organ damage  - Strict I&O's  - Blood cultures ordered  - Urine cultures ordered  - Follow and specialize treatments appropriately  - HIV serology ordered  VTE Risk Mitigation (From admission, onward)           Ordered     IP VTE LOW RISK PATIENT  Once         07/05/25 1825     Place sequential compression device  Until discontinued         07/05/25 1825                      Pharmacokinetic Initial Assessment: IV Vancomycin    Assessment/Plan:    Initiate intravenous vancomycin with loading dose of 1750 mg once followed by a maintenance dose of vancomycin 1000 mg IV every 12 hours  Desired empiric serum trough concentration is 15 to 20 mcg/mL  Draw vancomycin trough level 60 min prior to fourth dose on Monday 7/7/2025 at approximately 0200  Pharmacy will continue to follow and monitor vancomycin.      Please contact pharmacy with any questions regarding this assessment.     Thank you for the consult,   Dale Araujo       Patient brief summary:  Neel Soliman is a 39 y.o. male initiated on antimicrobial therapy with IV Vancomycin for treatment of suspected sepsis    Drug Allergies:   Review of patient's allergies indicates:  No Known Allergies    Actual Body Weight:   70.3 kg    Renal Function:   Estimated Creatinine Clearance: 90.5 mL/min (based on SCr of 1.1 mg/dL).,     Dialysis Method (if applicable):  N/A    CBC (last 72 hours):  Recent Labs   Lab Result Units 07/05/25  1307   WBC K/uL 8.68   HGB gm/dL 15.3   HCT % 44.7   Platelet Count K/uL 174   Lymph % % 4.7*   Mono % % 6.5  "  Eos % % 0.5   Basophil % % 0.5       Metabolic Panel (last 72 hours):  Recent Labs   Lab Result Units 07/05/25  1307   Sodium mmol/L 138   Potassium mmol/L 3.6   Chloride mmol/L 107   CO2 mmol/L 19*   Glucose mg/dL 116*   BUN mg/dL 15   Creatinine mg/dL 1.1   Albumin g/dL 4.0   Bilirubin Total mg/dL 1.0   ALP unit/L 62   AST unit/L 17   ALT unit/L 28   Magnesium  mg/dL 1.6       Drug levels (last 3 results):  No results for input(s): "VANCOMYCINRA", "VANCORANDOM", "VANCOMYCINPE", "VANCOPEAK", "VANCOMYCINTR", "VANCOTROUGH" in the last 72 hours.    Microbiologic Results:  Microbiology Results (last 7 days)       Procedure Component Value Units Date/Time    Clostridium difficile EIA [[id number]]     Order Status: Sent Specimen: Stool     Stool culture [[id number]]     Order Status: Sent Specimen: Stool     Influenza A & B by Molecular [[id number]]  (Normal) Collected: 07/05/25 1307    Order Status: Completed Specimen: Nasal Swab Updated: 07/05/25 1401     INFLUENZA A MOLECULAR Negative     INFLUENZA B MOLECULAR  Negative    Blood culture x two cultures. Draw prior to antibiotics. [[id number]] Collected: 07/05/25 1316    Order Status: Resulted Specimen: Blood from Peripheral, Antecubital, Right Updated: 07/05/25 1359    Blood culture x two cultures. Draw prior to antibiotics. [[id number]] Collected: 07/05/25 1307    Order Status: Resulted Specimen: Blood from Peripheral, Antecubital, Left Updated: 07/05/25 1358              Rubio Johnson MD  Department of Hospital Medicine  [facility] - Emergency Dept          "

## 2025-07-05 NOTE — ED NOTES
Patient identifiers for Neel Soliman 39 y.o. male checked and correct.  Chief Complaint   Patient presents with    Fever    Diarrhea     Diarrhea started 12 hrs ago and temp 102, took tylenol at 0930, no antibiotics recently     History reviewed. No pertinent past medical history.  Allergies reported: Review of patient's allergies indicates:  No Known Allergies    Appearance: Pt awake, alert & oriented to person, place & time. Pt in no acute distress at present time. Pt is clean and well groomed with clothes appropriately fastened.   Skin: Skin warm, dry & intact. Color consistent with ethnicity. Mucous membranes moist. No breakdown or brusing noted.   Musculoskeletal: Patient moving all extremities well, no obvious swelling or deformities noted.   Respiratory: Respirations spontaneous, even, and non-labored. Visible chest rise noted. Airway is open and patent. No accessory muscle use noted.   Neurologic: Sensation is intact. Speech is clear and appropriate. Eyes open spontaneously, behavior appropriate to situation, follows commands, facial expression symmetrical, bilateral hand grasp equal and even, purposeful motor response noted.  Cardiac: All peripheral pulses present. No Bilateral lower extremity edema. Cap refill is <3 seconds.  Abdomen: Abdomen soft, non distended, non tender to palpation.   : Pt voids independently, denies dysuria, hematuria, frequency.

## 2025-07-05 NOTE — SUBJECTIVE & OBJECTIVE
History reviewed. No pertinent past medical history.    History reviewed. No pertinent surgical history.    Review of patient's allergies indicates:  No Known Allergies    No current facility-administered medications on file prior to encounter.     Current Outpatient Medications on File Prior to Encounter   Medication Sig    dextroamphetamine-amphetamine (ADDERALL) 10 mg Tab Take 1 tablet by mouth 2 (two) times a day. 1-2 times daily    emtricitabine-tenofovir, TDF, (TRUVADA) 100-150 mg Tab Take 1 tablet by mouth once daily.  (Patient not taking: Reported on 4/4/2025)    lamoTRIgine (LAMICTAL) 25 MG tablet Take 25 mg by mouth once daily.  (Patient not taking: Reported on 4/4/2025)    LORazepam (ATIVAN) 2 MG Tab Take 1 mg by mouth every evening.  (Patient not taking: Reported on 4/4/2025)    propranoloL (INDERAL) 10 MG tablet Take 1 tablet (10 mg total) by mouth every 6 (six) hours as needed (palpitations).     Family History       Problem Relation (Age of Onset)    Heart attack Maternal Grandfather    Hyperlipidemia Mother, Father          Tobacco Use    Smoking status: Former     Current packs/day: 1.00     Average packs/day: 1 pack/day for 3.0 years (3.0 ttl pk-yrs)     Types: Cigarettes    Smokeless tobacco: Never   Substance and Sexual Activity    Alcohol use: No    Drug use: No    Sexual activity: Yes     Partners: Male     Review of Systems   Constitutional:  Positive for appetite change, diaphoresis, fever and unexpected weight change.        20 lbs wl over 4 months. Positive fever/chills. Decreased oral intake   HENT: Negative.     Eyes: Negative.    Respiratory: Negative.     Cardiovascular: Negative.    Gastrointestinal:  Positive for abdominal pain and diarrhea. Negative for nausea and vomiting.   Endocrine: Negative.    Genitourinary:  Positive for decreased urine volume and dysuria.   Musculoskeletal: Negative.    Skin:  Positive for pallor. Negative for rash.   Allergic/Immunologic: Negative.     Neurological:  Positive for weakness and light-headedness.   Hematological: Negative.    Psychiatric/Behavioral: Negative.       Objective:     Vital Signs (Most Recent):  Temp: 99.7 °F (37.6 °C) (07/05/25 1459)  Pulse: (!) 114 (07/05/25 1800)  Resp: 18 (07/05/25 1800)  BP: 109/65 (07/05/25 1800)  SpO2: 99 % (07/05/25 1800) Vital Signs (24h Range):  Temp:  [99.7 °F (37.6 °C)-100.9 °F (38.3 °C)] 99.7 °F (37.6 °C)  Pulse:  [106-119] 114  Resp:  [15-22] 18  SpO2:  [96 %-100 %] 99 %  BP: ()/(60-73) 109/65     Weight: 70.3 kg (155 lb)  Body mass index is 21.62 kg/m².     Physical Exam  Constitutional:       General: He is in acute distress.      Appearance: He is ill-appearing.      Comments: Patient had visible drowsiness and malaise   HENT:      Head: Normocephalic and atraumatic.      Nose: Nose normal.      Mouth/Throat:      Mouth: Mucous membranes are dry.      Pharynx: Oropharynx is clear.   Eyes:      Extraocular Movements: Extraocular movements intact.      Conjunctiva/sclera: Conjunctivae normal.      Pupils: Pupils are equal, round, and reactive to light.   Cardiovascular:      Rate and Rhythm: Regular rhythm. Tachycardia present.      Pulses: Normal pulses.      Heart sounds: Normal heart sounds.      Comments: Sinus tach  Pulmonary:      Effort: Pulmonary effort is normal.      Breath sounds: Normal breath sounds.   Abdominal:      General: Abdomen is flat.      Palpations: Abdomen is soft.      Tenderness: There is abdominal tenderness. There is no guarding or rebound.      Comments: R>L abdominal tenderness   Musculoskeletal:         General: Normal range of motion.      Cervical back: Normal range of motion and neck supple.   Skin:     General: Skin is warm and dry.      Coloration: Skin is pale.   Neurological:      Sensory: No sensory deficit.      Motor: Weakness present.      Comments: Visibly exhausted but not confused or disoriented              CRANIAL NERVES     CN III, IV, VI   Pupils are  equal, round, and reactive to light.       Significant Labs: All pertinent labs within the past 24 hours have been reviewed.    Significant Imaging: I have reviewed all pertinent imaging results/findings within the past 24 hours.

## 2025-07-05 NOTE — PROVIDER PROGRESS NOTES - EMERGENCY DEPT.
Encounter Date: 7/5/2025    ED Physician Progress Notes          Patient care assumed at shift change. Briefly, patient presents with diarrhea. At time of handoff, we are pending CT abdomen and pelvis with IV contrast.     Ordered additional 1.5 L of isotonic IV fluids due to patient's having continued tachycardia after administration of 1 L of isotonic IV fluids.  Reassuring lab work.  Ordered stool cultures due to patient's complaining of significant frequency of bowel movements.  CT abdomen and pelvis with IV contrast shows findings suggestive of diarrheal illness.  Case discussed with hospital medicine who admitted patient says service for further workup and management.     23

## 2025-07-05 NOTE — ASSESSMENT & PLAN NOTE
Diarrheal illness complicated by tachycardia, fever, and dehydration.  - Vanc/Zosyn initated due to sepsis protocol  - C diff/Rotavirus/Crypto/Giardia ordered  - Aggressive IV/Po rehydration therapy initiated  - Monitor for electrolyte derangements and signs of end organ damage  - Strict I&O's  - Blood cultures ordered  - Urine cultures ordered  - Follow and specialize treatments appropriately  - HIV serology ordered

## 2025-07-05 NOTE — ED PROVIDER NOTES
Encounter Date: 7/5/2025       History     Chief Complaint   Patient presents with    Fever    Diarrhea     Diarrhea started 12 hrs ago and temp 102, took tylenol at 0930, no antibiotics recently     HPI    Patient is a 39-year-old male with no significant past medical history presenting to the ED due to acute onset fever, abdominal pain, diarrhea.  Diarrhea does not appear abnormal for the patient.  Recently traveled from [place].  No other sick contacts.  Did not go camping.    T-max 102° at home.  His mother had given him Tylenol PTA.  No chest pain, shortness of breath, nausea/vomiting.    Review of patient's allergies indicates:  No Known Allergies  History reviewed. No pertinent past medical history.  Past Surgical History:   Procedure Laterality Date    COLONOSCOPY N/A 7/8/2025    Procedure: COLONOSCOPY;  Surgeon: Damon Holcomb MD;  Location: [facility] ([address] [place] [address]);  Service: Endoscopy;  Laterality: N/A;     Family History   Problem Relation Name Age of Onset    Hyperlipidemia Mother      Hyperlipidemia Father      Heart attack Maternal Grandfather       Social History[1]    Physical Exam     Initial Vitals [07/05/25 1159]   BP Pulse Resp Temp SpO2   (!) 154/70 (!) 119 (!) 22 (!) 100.9 °F (38.3 °C) 96 %      MAP       --             Physical Exam    Constitutional: He appears well-developed and well-nourished. He is not diaphoretic. No distress.   Ill-appearing   Eyes: Conjunctivae and EOM are normal.   Cardiovascular:  Normal rate, regular rhythm and intact distal pulses.           Pulmonary/Chest: Breath sounds normal. No respiratory distress. He has no wheezes.   Abdominal: Abdomen is soft. He exhibits no distension. There is abdominal tenderness (Diffusely).     Neurological: He is alert.   Skin: Skin is warm and dry.   Psychiatric: He has a normal mood and affect. Thought content normal.         ED Course   Procedures  Labs Reviewed   COMPREHENSIVE METABOLIC PANEL - Abnormal       Result Value    Sodium  138      Potassium 3.6      Chloride 107      CO2 19 (*)     Glucose 116 (*)     BUN 15      Creatinine 1.1      Calcium 8.8      Protein Total 6.7      Albumin 4.0      Bilirubin Total 1.0      ALP 62      AST 17      ALT 28      Anion Gap 12      eGFR >60     URINALYSIS, REFLEX TO URINE CULTURE - Abnormal    Color, UA Yellow      Appearance, UA Clear      pH, UA 7.0      Spec Grav UA >=1.030 (*)     Protein, UA 1+ (*)     Glucose, UA Negative      Ketones, UA Negative      Bilirubin, UA Negative      Blood, UA Negative      Nitrites, UA Negative      Urobilinogen, UA Negative      Leukocyte Esterase, UA Negative     CBC WITH DIFFERENTIAL - Abnormal    WBC 8.68      RBC 5.21      HGB 15.3      HCT 44.7      MCV 86      MCH 29.4      MCHC 34.2      RDW 12.7      Platelet Count 174      MPV 9.5      Nucleated RBC 0      Neut % 87.0 (*)     Lymph % 4.7 (*)     Mono % 6.5      Eos % 0.5      Basophil % 0.5      Imm Grans % 0.8 (*)     Neut # 7.56      Lymph # 0.41 (*)     Mono # 0.56      Eos # 0.04      Baso # 0.04      Imm Grans # 0.07 (*)     Narrative:     This is an appended report.  These results have been appended to a previously verified report.   POCT GLUCOSE - Abnormal    POCT Glucose 121 (*)    INFLUENZA A & B BY MOLECULAR - Normal    INFLUENZA A MOLECULAR Negative      INFLUENZA B MOLECULAR  Negative     HEPATITIS C ANTIBODY - Normal    Hep C Ab Interp Non-Reactive     HIV 1 / 2 ANTIBODY - Normal    HIV 1/2 Ag/Ab Non-Reactive     MAGNESIUM - Normal    Magnesium  1.6     SARS-COV-2 RNA AMPLIFICATION, QUAL - Normal    SARS COV-2 Molecular Negative     LACTIC ACID, PLASMA - Normal    Lactic Acid Level 2.1      Narrative:     Falsely low lactic acid results can be found in samples containing >=13.0 mg/dL total bilirubin and/or >=3.5 mg/dL direct bilirubin.    CBC W/ AUTO DIFFERENTIAL    Narrative:     The following orders were created for panel order CBC auto differential.  Procedure                                Abnormality         Status                     ---------                               -----------         ------                     CBC with Differential[[id number]]       Abnormal            Final result                 Please view results for these tests on the individual orders.   URINALYSIS MICROSCOPIC    RBC, UA 1      WBC, UA <1      Bacteria, UA None      Yeast, UA None      Hyaline Casts, UA 0      Microscopic Comment         EKG Readings: (Independently Interpreted)   Rhythm: Sinus Tachycardia. Heart Rate: 110. Ectopy: No Ectopy. Conduction: Normal. T Waves Flipped: AVR, AVL and V1. Axis: Right Axis Deviation. Clinical Impression: Sinus Tachycardia     ECG Results              EKG 12-lead (Final result)        Collection Time Result Time QRS Duration OHS QTC Calculation    07/05/25 13:12:48 07/06/25 10:30:24 92 [id number]                     Final result by Interface, Lab In [facility] (07/06/25 10:30:28)                   Narrative:    Test Reason : Z13.6,    Vent. Rate : 110 BPM     Atrial Rate : 110 BPM     P-R Int : 136 ms          QRS Dur :  92 ms      QT Int : 314 ms       P-R-T Axes :  79  95  65 degrees    QTcB Int : 424 ms    Sinus tachycardia  Incomplete right bundle branch block  Rightward axis    Borderline Abnormal ECG  When compared with ECG of 04-Apr-2025 08:34,  Vent. rate has increased by  40 bpm  Confirmed by Shawn Milton ([id number]) on 7/6/2025 10:30:22 AM    Referred By: AAAREFERRAL SELF           Confirmed By: Shawn Milton                                  Imaging Results              CT Abdomen Pelvis With IV Contrast NO Oral Contrast (Final result)  Result time 07/05/25 16:59:00      Final result by Ángel Anne MD (07/05/25 16:59:00)                   Impression:      1. Liquid stool throughout the large bowel noting a few fluid-filled distal small bowel loops.  Taken together, findings suggest diarrheal illness, possibly on the basis of infectious or inflammatory enteritis.   Correlation is advised.  2. There are a few scattered mesenteric lymph nodes, may reflect reactive change.  3. Hepatomegaly, correlation with LFTs recommended.  4. Please see above for several additional findings.      Electronically signed by: Ángel Anne MD  Date:    07/05/2025  Time:    16:59               Narrative:    EXAMINATION:  CT ABDOMEN PELVIS WITH IV CONTRAST    CLINICAL HISTORY:  Abdominal pain, acute, nonlocalized;    TECHNIQUE:  Low dose axial images, sagittal and coronal reformations were obtained from the lung bases to the pubic symphysis following the IV administration of 100 mL of Omnipaque 350 .  Oral contrast was not given.    COMPARISON:  None.    FINDINGS:  Images of the lower thorax are remarkable for minimal dependent atelectasis.    Allowing for motion artifact, the liver is enlarged, correlation with LFTs recommended.  The spleen, pancreas, gallbladder and adrenal glands are grossly unremarkable.  The portal vein, splenic vein, SMV, celiac axis and SMA all are patent.  The stomach is distended without wall thickening.  No significant abdominal lymphadenopathy.    The kidneys enhance symmetrically without hydronephrosis or nephrolithiasis.  The bilateral ureters are unremarkable without calculi seen.  The urinary bladder is somewhat decompressed noting there may be residual wall thickening.  Correlation with urinalysis recommended.    There is scattered liquid stool throughout the large bowel.  There is some wall thickening involving the distal descending colon.  The terminal ileum is unremarkable.  The appendix is unremarkable, no pericecal inflammation.  There are a few distal fluid-filled small bowel loops without significant distention.  There are a few scattered shotty periaortic, pericaval, and mesenteric lymph nodes.  No focal organized pelvic fluid collection.    No acute osseous abnormality.  No significant inguinal lymphadenopathy.                                       X-Ray  Chest AP Portable (Final result)  Result time 07/05/25 14:10:20      Final result by Ally Covarrubias MD (07/05/25 14:10:20)                   Impression:      Clear lungs.      Electronically signed by: Ally Covarrubias MD  Date:    07/05/2025  Time:    14:10               Narrative:    EXAMINATION:  XR CHEST AP PORTABLE    CLINICAL HISTORY:  Sepsis;    TECHNIQUE:  Single frontal view of the chest was performed.    COMPARISON:  Prior dated 12/05/2019    FINDINGS:  The mediastinal structures are midline.  The cardiac silhouette is not enlarged.  The lungs appear grossly clear.  No osseous abnormalities are seen.                                       Medications   lactated ringers infusion ( Intravenous New Bag 7/6/25 0130)   potassium, sodium phosphates 280-160-250 mg packet 2 packet (2 packets Oral Given 7/7/25 1759)   lactated ringers infusion ( Intravenous New Bag 7/7/25 1847)   lactated ringers bolus 1,000 mL (0 mLs Intravenous Stopped 7/5/25 1452)   vancomycin 1750 mg in 0.9% sodium chloride 500 mL IVPB (0 mg Intravenous Stopped 7/5/25 1658)   piperacillin-tazobactam (ZOSYN) 4.5 g in D5W 100 mL IVPB (MB+) (0 g Intravenous Stopped 7/6/25 1000)   morphine injection 4 mg (4 mg Intravenous Given 7/5/25 1345)   lactated ringers bolus 1,500 mL (0 mLs Intravenous Stopped 7/5/25 1804)   acetaminophen tablet 1,000 mg (1,000 mg Oral Given 7/5/25 1403)   ketorolac injection 9.999 mg (9.999 mg Intravenous Given 7/5/25 1404)   morphine injection 4 mg (4 mg Intravenous Given 7/5/25 1520)   iohexoL (OMNIPAQUE 350) injection 100 mL (100 mLs Intravenous Given 7/5/25 1545)   aluminum-magnesium hydroxide-simethicone 200-200-20 mg/5 mL suspension 30 mL (30 mLs Oral Given 7/5/25 1651)     And   LIDOcaine viscous HCl 2% oral solution 15 mL (15 mLs Oral Given 7/5/25 1651)   lactated ringers bolus 1,000 mL (0 mLs Intravenous Stopped 7/5/25 2335)   morphine injection 2 mg (2 mg Intravenous Given 7/6/25 0113)   magnesium sulfate  2g in water 50mL IVPB (premix) (0 g Intravenous Stopped 7/6/25 1904)   potassium, sodium phosphates 280-160-250 mg packet 2 packet (2 packets Oral Given 7/6/25 1800)   potassium chloride CR capsule 50 mEq (50 mEq Oral Given 7/7/25 1129)   polyethylene glycol (GoLYTELY) solution (4,000 mLs Oral Given 7/7/25 2103)   potassium phosphate 30 mmol in D5W 500 mL infusion (0 mmol Intravenous Stopped 7/8/25 1432)   potassium chloride SA CR tablet 40 mEq (40 mEq Oral Given 7/9/25 0916)     Medical Decision Making  Amount and/or Complexity of Data Reviewed  Labs: ordered. Decision-making details documented in ED Course.  Radiology: ordered.    Risk  OTC drugs.  Prescription drug management.  Decision regarding hospitalization.      Patient is a 39-year-old male with a significant past medical history presenting to the ED due to acute onset fever, abdominal pain, diarrhea.      On presentation, patient is febrile and tachycardic.  Sepsis workup initiated.  Abdominal tenderness noted on physical exam.      Patient was given vancomycin and Zosyn.  Initial lactic 2.1.  Given 30 cc/kg bolus of fluids given his tachycardia and concern for likely infection could.  No history of heart problems.    Patient's labs other than lactic are overall reassuring.  CT abdomen and pelvis was ordered given his tenderness on exam in addition to his overall clinical appearance.  He was signed out to oncoming team pending the CT scan.          Attending Attestation:   Physician Attestation Statement for Resident:  As the supervising MD   Physician Attestation Statement: I have personally seen and examined this patient.   I agree with the above history.  -:   As the supervising MD I agree with the above PE.     As the supervising MD I agree with the above treatment, course, plan, and disposition.            Attending Critical Care:   Critical Care Times:   Direct Patient Care (initial evaluation, reassessments, and time considering the  case)................................................................25 minutes.   Ordering, Reviewing, and Interpreting Diagnostic Studies...............................................................................................................5 minutes.   Documentation..................................................................................................................................................................................5 minutes.   ==============================================================  Total Critical Care Time - exclusive of procedural time: 35 minutes.  ==============================================================  Critical care was necessary to treat or prevent imminent or life-threatening deterioration of the following conditions: sepsis.   Critical care was time spent personally by me on the following activities: obtaining history from patient or relative, examination of patient, review of old charts, ordering lab, x-rays, and/or EKG, development of treatment plan with patient or relative, ordering and performing treatments and interventions, evaluation of patient's response to treatment and re-evaluation of patient's conition.   Critical Care Condition: potentially life-threatening       Attending ED Notes:   STAFF ATTENDING PHYSICIAN NOTE:  I have individually/jointly evaluated Neel Soliman and discussed their ED management with the resident physician. I have also reviewed their notes, assessments, and procedures documented.  I was present during all critical portions of any procedure(s) performed on Neel Soliman.   ____________________  Winston Kong MD, [facility]  Emergency Medicine Staff        ED Course as of 07/13/25 1456   Sat Jul 05, 2025   1342 POC Lactate: 2.1 [DR]   1427 CBC auto differential(!)  No leukocytosis or anemia [DR]   1427 CO2(!): 19 [DR]   1427 Comprehensive metabolic panel(!)  Normal renal function; no significant electrolyte abnormalities [DR]      ED  Course User Index  [DR] Yuliana Nuñez DO                           Clinical Impression:  Final diagnoses:  [Z13.6] Screening for cardiovascular condition  [R19.7] Diarrhea          ED Disposition Condition    Admit                     Yuliana Nuñez DO  Resident  07/05/25 1535         [1]   Social History  Tobacco Use    Smoking status: Former     Current packs/day: 1.00     Average packs/day: 1 pack/day for 3.0 years (3.0 ttl pk-yrs)     Types: Cigarettes    Smokeless tobacco: Never   Substance Use Topics    Alcohol use: No    Drug use: No        Surjit Kong MD  07/13/25 3493

## 2025-07-06 LAB
ABSOLUTE EOSINOPHIL (OHS): 0 K/UL
ABSOLUTE MONOCYTE (OHS): 0.39 K/UL (ref 0.3–1)
ABSOLUTE NEUTROPHIL COUNT (OHS): 3.16 K/UL (ref 1.8–7.7)
ALBUMIN SERPL BCP-MCNC: 2.7 G/DL (ref 3.5–5.2)
ALP SERPL-CCNC: 42 UNIT/L (ref 40–150)
ALT SERPL W/O P-5'-P-CCNC: 45 UNIT/L (ref 10–44)
ANION GAP (OHS): 9 MMOL/L (ref 8–16)
AST SERPL-CCNC: 23 UNIT/L (ref 11–45)
BASOPHILS # BLD AUTO: 0.09 K/UL
BASOPHILS NFR BLD AUTO: 1.9 %
BILIRUB DIRECT SERPL-MCNC: 0.6 MG/DL (ref 0.1–0.3)
BILIRUB SERPL-MCNC: 1.7 MG/DL (ref 0.1–1)
BUN SERPL-MCNC: 13 MG/DL (ref 6–20)
C DIFF GDH STL QL: NEGATIVE
C DIFF TOX A+B STL QL IA: NEGATIVE
CALCIUM SERPL-MCNC: 7.6 MG/DL (ref 8.7–10.5)
CHLORIDE SERPL-SCNC: 107 MMOL/L (ref 95–110)
CO2 SERPL-SCNC: 19 MMOL/L (ref 23–29)
CREAT SERPL-MCNC: 0.9 MG/DL (ref 0.5–1.4)
ERYTHROCYTE [DISTWIDTH] IN BLOOD BY AUTOMATED COUNT: 13 % (ref 11.5–14.5)
GFR SERPLBLD CREATININE-BSD FMLA CKD-EPI: >60 ML/MIN/1.73/M2
GLUCOSE SERPL-MCNC: 103 MG/DL (ref 70–110)
HCT VFR BLD AUTO: 44.5 % (ref 40–54)
HGB BLD-MCNC: 15.1 GM/DL (ref 14–18)
HOLD SPECIMEN: NORMAL
IMM GRANULOCYTES # BLD AUTO: 0.07 K/UL (ref 0–0.04)
IMM GRANULOCYTES NFR BLD AUTO: 1.5 % (ref 0–0.5)
LACTATE SERPL-SCNC: 1.3 MMOL/L (ref 0.5–2.2)
LACTATE SERPL-SCNC: 1.4 MMOL/L (ref 0.5–2.2)
LACTATE SERPL-SCNC: 2.1 MMOL/L (ref 0.5–2.2)
LYMPHOCYTES # BLD AUTO: 0.94 K/UL (ref 1–4.8)
MAGNESIUM SERPL-MCNC: 1.3 MG/DL (ref 1.6–2.6)
MCH RBC QN AUTO: 29.5 PG (ref 27–31)
MCHC RBC AUTO-ENTMCNC: 33.9 G/DL (ref 32–36)
MCV RBC AUTO: 87 FL (ref 82–98)
NUCLEATED RBC (/100WBC) (OHS): 0 /100 WBC
OHS QRS DURATION: 92 MS
OHS QTC CALCULATION: 424 MS
PHOSPHATE SERPL-MCNC: 2.4 MG/DL (ref 2.7–4.5)
PLATELET # BLD AUTO: 177 K/UL (ref 150–450)
PMV BLD AUTO: 9.2 FL (ref 9.2–12.9)
POTASSIUM SERPL-SCNC: 4.3 MMOL/L (ref 3.5–5.1)
PROT SERPL-MCNC: 5.1 GM/DL (ref 6–8.4)
RBC # BLD AUTO: 5.11 M/UL (ref 4.6–6.2)
RELATIVE EOSINOPHIL (OHS): 0 %
RELATIVE LYMPHOCYTE (OHS): 20.2 % (ref 18–48)
RELATIVE MONOCYTE (OHS): 8.4 % (ref 4–15)
RELATIVE NEUTROPHIL (OHS): 68 % (ref 38–73)
SODIUM SERPL-SCNC: 135 MMOL/L (ref 136–145)
T PALLIDUM IGG+IGM SER QL: NORMAL
WBC # BLD AUTO: 4.65 K/UL (ref 3.9–12.7)
WBC #/AREA STL HPF: ABNORMAL /[HPF]

## 2025-07-06 PROCEDURE — 86593 SYPHILIS TEST NON-TREP QUANT: CPT

## 2025-07-06 PROCEDURE — 27000207 HC ISOLATION

## 2025-07-06 PROCEDURE — 63600175 PHARM REV CODE 636 W HCPCS: Performed by: EMERGENCY MEDICINE

## 2025-07-06 PROCEDURE — 25000003 PHARM REV CODE 250

## 2025-07-06 PROCEDURE — 84100 ASSAY OF PHOSPHORUS: CPT

## 2025-07-06 PROCEDURE — 85025 COMPLETE CBC W/AUTO DIFF WBC: CPT

## 2025-07-06 PROCEDURE — 82248 BILIRUBIN DIRECT: CPT

## 2025-07-06 PROCEDURE — 36415 COLL VENOUS BLD VENIPUNCTURE: CPT

## 2025-07-06 PROCEDURE — 25000003 PHARM REV CODE 250: Performed by: EMERGENCY MEDICINE

## 2025-07-06 PROCEDURE — 83735 ASSAY OF MAGNESIUM: CPT

## 2025-07-06 PROCEDURE — 94761 N-INVAS EAR/PLS OXIMETRY MLT: CPT

## 2025-07-06 PROCEDURE — 83605 ASSAY OF LACTIC ACID: CPT

## 2025-07-06 PROCEDURE — 80053 COMPREHEN METABOLIC PANEL: CPT

## 2025-07-06 PROCEDURE — 63600175 PHARM REV CODE 636 W HCPCS

## 2025-07-06 PROCEDURE — 83605 ASSAY OF LACTIC ACID: CPT | Performed by: STUDENT IN AN ORGANIZED HEALTH CARE EDUCATION/TRAINING PROGRAM

## 2025-07-06 PROCEDURE — 87491 CHLMYD TRACH DNA AMP PROBE: CPT | Performed by: STUDENT IN AN ORGANIZED HEALTH CARE EDUCATION/TRAINING PROGRAM

## 2025-07-06 PROCEDURE — 21400001 HC TELEMETRY ROOM

## 2025-07-06 RX ORDER — DICYCLOMINE HYDROCHLORIDE 10 MG/1
10 CAPSULE ORAL 4 TIMES DAILY PRN
Status: DISCONTINUED | OUTPATIENT
Start: 2025-07-06 | End: 2025-07-09 | Stop reason: HOSPADM

## 2025-07-06 RX ORDER — ELECTROLYTES/DEXTROSE
400 SOLUTION, ORAL ORAL
Status: DISCONTINUED | OUTPATIENT
Start: 2025-07-06 | End: 2025-07-07

## 2025-07-06 RX ORDER — SODIUM,POTASSIUM PHOSPHATES 280-250MG
2 POWDER IN PACKET (EA) ORAL EVERY 4 HOURS
Status: COMPLETED | OUTPATIENT
Start: 2025-07-06 | End: 2025-07-06

## 2025-07-06 RX ORDER — MAGNESIUM SULFATE HEPTAHYDRATE 40 MG/ML
2 INJECTION, SOLUTION INTRAVENOUS ONCE
Status: COMPLETED | OUTPATIENT
Start: 2025-07-06 | End: 2025-07-06

## 2025-07-06 RX ORDER — OXYCODONE HYDROCHLORIDE 5 MG/1
5 TABLET ORAL EVERY 6 HOURS PRN
Refills: 0 | Status: DISCONTINUED | OUTPATIENT
Start: 2025-07-06 | End: 2025-07-09 | Stop reason: HOSPADM

## 2025-07-06 RX ORDER — SODIUM CHLORIDE, SODIUM LACTATE, POTASSIUM CHLORIDE, CALCIUM CHLORIDE 600; 310; 30; 20 MG/100ML; MG/100ML; MG/100ML; MG/100ML
INJECTION, SOLUTION INTRAVENOUS CONTINUOUS
Status: ACTIVE | OUTPATIENT
Start: 2025-07-06 | End: 2025-07-06

## 2025-07-06 RX ORDER — MORPHINE SULFATE 2 MG/ML
2 INJECTION, SOLUTION INTRAMUSCULAR; INTRAVENOUS ONCE
Status: COMPLETED | OUTPATIENT
Start: 2025-07-06 | End: 2025-07-06

## 2025-07-06 RX ADMIN — SODIUM CHLORIDE, SODIUM LACTATE, POTASSIUM CHLORIDE, AND CALCIUM CHLORIDE: .6; .31; .03; .02 INJECTION, SOLUTION INTRAVENOUS at 01:07

## 2025-07-06 RX ADMIN — ACETAMINOPHEN 650 MG: 325 TABLET ORAL at 04:07

## 2025-07-06 RX ADMIN — MORPHINE SULFATE 2 MG: 2 INJECTION, SOLUTION INTRAMUSCULAR; INTRAVENOUS at 01:07

## 2025-07-06 RX ADMIN — Medication 400 ML: at 02:07

## 2025-07-06 RX ADMIN — POTASSIUM & SODIUM PHOSPHATES POWDER PACK 280-160-250 MG 2 PACKET: 280-160-250 PACK at 02:07

## 2025-07-06 RX ADMIN — VANCOMYCIN HYDROCHLORIDE 1000 MG: 1 INJECTION, POWDER, LYOPHILIZED, FOR SOLUTION INTRAVENOUS at 03:07

## 2025-07-06 RX ADMIN — POTASSIUM & SODIUM PHOSPHATES POWDER PACK 280-160-250 MG 2 PACKET: 280-160-250 PACK at 06:07

## 2025-07-06 RX ADMIN — MAGNESIUM SULFATE HEPTAHYDRATE 2 G: 40 INJECTION, SOLUTION INTRAVENOUS at 05:07

## 2025-07-06 RX ADMIN — Medication 400 ML: at 10:07

## 2025-07-06 RX ADMIN — DICYCLOMINE HYDROCHLORIDE 10 MG: 10 CAPSULE ORAL at 04:07

## 2025-07-06 RX ADMIN — Medication 400 ML: at 06:07

## 2025-07-06 RX ADMIN — DICYCLOMINE HYDROCHLORIDE 10 MG: 10 CAPSULE ORAL at 12:07

## 2025-07-06 RX ADMIN — PIPERACILLIN SODIUM AND TAZOBACTAM SODIUM 4.5 G: 4; .5 INJECTION, POWDER, LYOPHILIZED, FOR SOLUTION INTRAVENOUS at 06:07

## 2025-07-06 RX ADMIN — OXYCODONE 5 MG: 5 TABLET ORAL at 09:07

## 2025-07-06 RX ADMIN — Medication 400 ML: at 09:07

## 2025-07-06 RX ADMIN — PIPERACILLIN SODIUM AND TAZOBACTAM SODIUM 4.5 G: 4; .5 INJECTION, POWDER, FOR SOLUTION INTRAVENOUS at 05:07

## 2025-07-06 NOTE — PLAN OF CARE
CM attempted to complete initial assessment, unable to perform secondary to providers rounds, will attempt at later time.     Harini Watkins RN  Case Management  [phone]

## 2025-07-06 NOTE — PROGRESS NOTES
[facility] Medicine  Progress Note    Patient Name: Neel Soliman  MRN: [id number]  Patient Class: IP- Inpatient   Admission Date: 7/5/2025  Length of Stay: 1 days  Attending Physician: Eren Smiley MD  Primary Care Provider: Lakshmi Callahan MD        Subjective     Principal Problem:Diarrhea        HPI:  Patient is a 38 y/o WM presenting for acute onset abdominal pain, fever, and diarrheal illness. Most of the history is provided by his mother, who is accompanying him. He began having diarrhea on 7/4 with approximately 12 loose bowel movements at home. He was brought in by his mother and father who were concerned about his illness. His mother reports several instances of similar diarrheal illnesses in the past, some of which have required hospitalization. She reports that the disturbances can sometimes coincide with spicy food but are oftentimes random. She has a personal history of pancreatic insufficiency and requires dietary enzyme supplementation. She says that he was working all day prior to the onset of the symptoms, and has been working outside in a fruit garden recently. He has an extensive travel history, and has recently returned from two long-distance flights within the continental [place]. He frequently travels back and forth between [place] and a residence in [facility]. He has taken the same pancreatic supplements as his mother in the past and she reports mixed improvement in his symptoms. He has no pets currently. 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    Overview/Hospital Course:  Patient was admitted and given aggressive oral/IV rehydration therapy. Diarrhea continued into admission with >1 episode per hour of watery, loose stool. Infectious vs autoimmune workup ordered. Patient was placed on bland diet to encourage oral intake.    Interval History: No interval change in diarrhea or abdominal tenderness. Patient is in clear  distress and appears very ill/uncomfortable. Decreased oral intake due to perceived increase in diarrheal frequency.    Review of Systems  Objective:     Vital Signs (Most Recent):  Temp: 98.6 °F (37 °C) (07/06/25 1134)  Pulse: 110 (07/06/25 1134)  Resp: 18 (07/06/25 1134)  BP: 112/74 (07/06/25 1134)  SpO2: 98 % (07/06/25 1134) Vital Signs (24h Range):  Temp:  [98.4 °F (36.9 °C)-102.7 °F (39.3 °C)] 98.6 °F (37 °C)  Pulse:  [103-129] 110  Resp:  [16-20] 18  SpO2:  [97 %-100 %] 98 %  BP: ()/(49-74) 112/74     Weight: 70.3 kg (155 lb)  Body mass index is 21.62 kg/m².    Intake/Output Summary (Last 24 hours) at 7/6/2025 1426  Last data filed at 7/6/2025 0117  Gross per 24 hour   Intake 2477.92 ml   Output --   Net 2477.92 ml         Physical Exam  Constitutional:       General: He is in acute distress.      Appearance: He is ill-appearing and diaphoretic.   HENT:      Head: Normocephalic and atraumatic.      Nose: Nose normal.      Mouth/Throat:      Pharynx: Oropharynx is clear.   Eyes:      Extraocular Movements: Extraocular movements intact.      Conjunctiva/sclera: Conjunctivae normal.      Pupils: Pupils are equal, round, and reactive to light.   Cardiovascular:      Rate and Rhythm: Normal rate and regular rhythm.      Pulses: Normal pulses.      Heart sounds: Normal heart sounds.   Pulmonary:      Effort: Pulmonary effort is normal.      Breath sounds: Normal breath sounds.   Abdominal:      General: Abdomen is flat. There is no distension.      Palpations: Abdomen is soft.      Tenderness: There is abdominal tenderness. There is no guarding.      Comments: Pan-abdominal tenderness.    Musculoskeletal:         General: Normal range of motion.      Cervical back: Normal range of motion and neck supple.   Skin:     General: Skin is warm.      Coloration: Skin is pale.   Neurological:      General: No focal deficit present.      Mental Status: He is alert and oriented to person, place, and time.      Motor:  "Weakness present.   Psychiatric:         Mood and Affect: Mood normal.         Behavior: Behavior normal.               Significant Labs: All pertinent labs within the past 24 hours have been reviewed.    Significant Imaging: I have reviewed all pertinent imaging results/findings within the past 24 hours.      Assessment & Plan  Diarrhea  Dehydration  Diarrheal illness complicated by tachycardia, fever, and dehydration.  - Vanc/Zosyn initated due to sepsis protocol  - C diff/Rotavirus/Crypto/Giardia ordered  - Aggressive IV/Po rehydration with LR therapy initiated  - Monitor for electrolyte derangements and replete PRN  - Monitor for signs of end organ hypoperfusion  - Strict I&O's  - Blood cultures ordered  - Urine cultures ordered  - Follow and specialize treatments appropriately  - HIV serology ordered  - Chlamydia/Gonorrhea (throat, urethra, rectum" ordered  - Syphilis antibodies ordered   VTE Risk Mitigation (From admission, onward)           Ordered     IP VTE LOW RISK PATIENT  Once         07/05/25 1825     Place sequential compression device  Until discontinued         07/05/25 1825                    Discharge Planning   CALEB: 7/8/2025     Code Status: Full Code   Medical Readiness for Discharge Date:   Discharge Plan A: Cezar Johnson MD  Department of Hospital Medicine   [facility] Surg    "

## 2025-07-06 NOTE — SUBJECTIVE & OBJECTIVE
Interval History: No interval change in diarrhea or abdominal tenderness. Patient is in clear distress and appears very ill/uncomfortable. Decreased oral intake due to perceived increase in diarrheal frequency.    Review of Systems  Objective:     Vital Signs (Most Recent):  Temp: 98.6 °F (37 °C) (07/06/25 1134)  Pulse: 110 (07/06/25 1134)  Resp: 18 (07/06/25 1134)  BP: 112/74 (07/06/25 1134)  SpO2: 98 % (07/06/25 1134) Vital Signs (24h Range):  Temp:  [98.4 °F (36.9 °C)-102.7 °F (39.3 °C)] 98.6 °F (37 °C)  Pulse:  [103-129] 110  Resp:  [16-20] 18  SpO2:  [97 %-100 %] 98 %  BP: ()/(49-74) 112/74     Weight: 70.3 kg (155 lb)  Body mass index is 21.62 kg/m².    Intake/Output Summary (Last 24 hours) at 7/6/2025 1426  Last data filed at 7/6/2025 0117  Gross per 24 hour   Intake 2477.92 ml   Output --   Net 2477.92 ml         Physical Exam  Constitutional:       General: He is in acute distress.      Appearance: He is ill-appearing and diaphoretic.   HENT:      Head: Normocephalic and atraumatic.      Nose: Nose normal.      Mouth/Throat:      Pharynx: Oropharynx is clear.   Eyes:      Extraocular Movements: Extraocular movements intact.      Conjunctiva/sclera: Conjunctivae normal.      Pupils: Pupils are equal, round, and reactive to light.   Cardiovascular:      Rate and Rhythm: Normal rate and regular rhythm.      Pulses: Normal pulses.      Heart sounds: Normal heart sounds.   Pulmonary:      Effort: Pulmonary effort is normal.      Breath sounds: Normal breath sounds.   Abdominal:      General: Abdomen is flat. There is no distension.      Palpations: Abdomen is soft.      Tenderness: There is abdominal tenderness. There is no guarding.      Comments: Pan-abdominal tenderness.    Musculoskeletal:         General: Normal range of motion.      Cervical back: Normal range of motion and neck supple.   Skin:     General: Skin is warm.      Coloration: Skin is pale.   Neurological:      General: No focal deficit  present.      Mental Status: He is alert and oriented to person, place, and time.      Motor: Weakness present.   Psychiatric:         Mood and Affect: Mood normal.         Behavior: Behavior normal.               Significant Labs: All pertinent labs within the past 24 hours have been reviewed.    Significant Imaging: I have reviewed all pertinent imaging results/findings within the past 24 hours.

## 2025-07-06 NOTE — PROGRESS NOTES
Pharmacokinetic Assessment Follow Up: IV Vancomycin    Vancomycin order has been discontinued. Pharmacy will sign off. Please reconsult as needed!     Thank you for the consult,   Ruslan Hassan

## 2025-07-06 NOTE — ASSESSMENT & PLAN NOTE
"Diarrheal illness complicated by tachycardia, fever, and dehydration.  - Vanc/Zosyn initated due to sepsis protocol  - C diff/Rotavirus/Crypto/Giardia ordered  - Aggressive IV/Po rehydration with LR therapy initiated  - Monitor for electrolyte derangements and replete PRN  - Monitor for signs of end organ hypoperfusion  - Strict I&O's  - Blood cultures ordered  - Urine cultures ordered  - Follow and specialize treatments appropriately  - HIV serology ordered  - Chlamydia/Gonorrhea (throat, urethra, rectum" ordered  - Syphilis antibodies ordered   "

## 2025-07-06 NOTE — PLAN OF CARE
[facility] - Med Surg  Initial Discharge Assessment       Primary Care Provider: Lakshmi Callahan MD    Admission Diagnosis: Diarrhea [R19.7]  Screening for cardiovascular condition [Z13.6]  Chest pain [R07.9]    Admission Date: 7/5/2025  Expected Discharge Date: 7/8/2025    Transition of Care Barriers: None    Payor: BLUE CROSS BLUE SHIELD / Plan: [place] PPO / Product Type: PPO /     Extended Emergency Contact Information  Primary Emergency Contact: Dusty Soliman  Mobile Phone: [phone]  Relation: Father  Preferred language: English   needed? No    Discharge Plan A: Home  Discharge Plan B: Home with family      [facility] ([place]) - [place] - [address] [place] [address]  [address] [place] [address]  [place] [address]  Phone: [phone] Fax: [phone]    [id number] - [place] - [address]  [address]  [place] [address]  Phone: [phone] Fax: [phone]      Initial Assessment (most recent)       Adult Discharge Assessment - 07/06/25 1421          Discharge Assessment    Assessment Type Discharge Planning Assessment     Confirmed/corrected address, phone number and insurance Yes     Confirmed Demographics Correct on Facesheet     Source of Information patient     Communicated CALEB with patient/caregiver Date not available/Unable to determine     People in Home parent(s)     Facility Arrived From: home     Do you expect to return to your current living situation? No     Do you have help at home or someone to help you manage your care at home? No     Prior to hospitilization cognitive status: Alert/Oriented     Current cognitive status: Alert/Oriented     Walking or Climbing Stairs Difficulty no     Dressing/Bathing Difficulty no     Home Accessibility wheelchair accessible     Home Layout Able to live on 1st floor     Equipment Currently Used at Home none     Readmission within 30 days? No     Patient currently being followed by outpatient case management? No     Do you  currently have service(s) that help you manage your care at home? No     Do you take prescription medications? Yes     Do you have prescription coverage? Yes     Do you have any problems affording any of your prescribed medications? TBD     Who is going to help you get home at discharge? Family     How do you get to doctors appointments? car, drives self;family or friend will provide     Are you on dialysis? No     Do you take coumadin? No     Discharge Plan A Home     Discharge Plan B Home with family     DME Needed Upon Discharge  none     Discharge Plan discussed with: Patient     Transition of Care Barriers None        Financial Resource Strain    How hard is it for you to pay for the very basics like food, housing, medical care, and heating? Not very hard        Housing Stability    In the last 12 months, was there a time when you were not able to pay the mortgage or rent on time? No     At any time in the past 12 months, were you homeless or living in a shelter (including now)? No        Transportation Needs    In the past 12 months, has lack of transportation kept you from medical appointments or from getting medications? No     In the past 12 months, has lack of transportation kept you from meetings, work, or from getting things needed for daily living? No        Food Insecurity    Within the past 12 months, you worried that your food would run out before you got the money to buy more. Never true     Within the past 12 months, the food you bought just didn't last and you didn't have money to get more. Never true        Social Isolation    How often do you feel lonely or isolated from those around you?  Rarely        Alcohol Use    Q2: How many drinks containing alcohol do you have on a typical day when you are drinking? Patient does not drink        Utilities    In the past 12 months has the electric, gas, oil, or water company threatened to shut off services in your home? No        Health Literacy    How  often do you need to have someone help you when you read instructions, pamphlets, or other written material from your doctor or pharmacy? Rarely                      Discharge Plan A and Plan B have been determined by review of patient's clinical status, future medical and therapeutic needs, and coverage/benefits for post-acute care in coordination with multidisciplinary team members.     CM spoke with patient in Room at bedside. All information was verified on facesheet. Patient lives in a 1 story house with parents when in country, also lives in [place]. Patient states no assistance is needed. Patient can ambulate, drive, run errands, and complete ADL's independently. Patient does not use any DME or any in-home assistive equipment. Patient has not used Home Health previously. Patient is not on dialysis nor use Coumadin as a blood thinner. Patient takes medication as prescribed and has resources for all prescriptive needs. Patient will have help from family member upon discharge. Family will provide transportation home. All Questions and concerns addressed and whiteboard updated with CM contact information. Will continue to follow for course of hospitalization.       Harini Watkins RN  Case Management  [phone]

## 2025-07-06 NOTE — HOSPITAL COURSE
Patient presenting with diarrheal illness. Given aggressive oral/IV rehydration therapy and electrolyte replacement. Extensive infectious vs autoimmune workup ordered. Patient was placed on bland diet to encourage oral intake. Symptomatic care. Colonoscopy done 7/8/25 results pending. IV antibiotics switched to PO on discharge to finish course. Imodium PRN provided. Gi follow up order placed in order to follow path results and discuss evaluation for pancreatic insufficiency given stool elastase sampling.

## 2025-07-07 LAB
ABSOLUTE EOSINOPHIL (OHS): 0 K/UL
ABSOLUTE MONOCYTE (OHS): 0.52 K/UL (ref 0.3–1)
ABSOLUTE NEUTROPHIL COUNT (OHS): 6.09 K/UL (ref 1.8–7.7)
ALBUMIN SERPL BCP-MCNC: 2.7 G/DL (ref 3.5–5.2)
ALP SERPL-CCNC: 53 UNIT/L (ref 40–150)
ALT SERPL W/O P-5'-P-CCNC: 36 UNIT/L (ref 10–44)
ANION GAP (OHS): 7 MMOL/L (ref 8–16)
AST SERPL-CCNC: 16 UNIT/L (ref 11–45)
BASOPHILS # BLD AUTO: 0.08 K/UL
BASOPHILS NFR BLD AUTO: 1 %
BILIRUB SERPL-MCNC: 1.2 MG/DL (ref 0.1–1)
BUN SERPL-MCNC: 10 MG/DL (ref 6–20)
C COLI+JEJ+UPSA DNA STL QL NAA+NON-PROBE: NEGATIVE
CALCIUM SERPL-MCNC: 8.1 MG/DL (ref 8.7–10.5)
CALPROTECTIN INTERP (OHS): ABNORMAL
CALPROTECTIN STOOL (OHS): 1500 ΜG/G
CHLORIDE SERPL-SCNC: 103 MMOL/L (ref 95–110)
CO2 SERPL-SCNC: 24 MMOL/L (ref 23–29)
CREAT SERPL-MCNC: 0.9 MG/DL (ref 0.5–1.4)
CRP SERPL-MCNC: 263.3 MG/L
CRP SERPL-MCNC: 266.11 MG/L
CRYPTOSP AG SPEC QL: NEGATIVE
E COLI SXT1 STL QL IA: NEGATIVE
E COLI SXT2 STL QL IA: NEGATIVE
ELASTASE, STOOL INTERPRETATION (OHS): ABNORMAL
ERYTHROCYTE [DISTWIDTH] IN BLOOD BY AUTOMATED COUNT: 12.9 % (ref 11.5–14.5)
ERYTHROCYTE [SEDIMENTATION RATE] IN BLOOD BY PHOTOMETRIC METHOD: 27 MM/HR
G LAMBLIA AG STL QL IA: NEGATIVE
GFR SERPLBLD CREATININE-BSD FMLA CKD-EPI: >60 ML/MIN/1.73/M2
GLUCOSE SERPL-MCNC: 94 MG/DL (ref 70–110)
HCT VFR BLD AUTO: 41 % (ref 40–54)
HGB BLD-MCNC: 14.1 GM/DL (ref 14–18)
IMM GRANULOCYTES # BLD AUTO: 0.03 K/UL (ref 0–0.04)
IMM GRANULOCYTES NFR BLD AUTO: 0.4 % (ref 0–0.5)
LYMPHOCYTES # BLD AUTO: 0.99 K/UL (ref 1–4.8)
MAGNESIUM SERPL-MCNC: 2 MG/DL (ref 1.6–2.6)
MCH RBC QN AUTO: 29.5 PG (ref 27–31)
MCHC RBC AUTO-ENTMCNC: 34.4 G/DL (ref 32–36)
MCV RBC AUTO: 86 FL (ref 82–98)
NUCLEATED RBC (/100WBC) (OHS): 0 /100 WBC
PANCREATIC ELASTASE, FECAL (OHS): 4.35 ΜG/G
PHOSPHATE SERPL-MCNC: 1.3 MG/DL (ref 2.7–4.5)
PLATELET # BLD AUTO: 148 K/UL (ref 150–450)
PMV BLD AUTO: 9.9 FL (ref 9.2–12.9)
POTASSIUM SERPL-SCNC: 3.7 MMOL/L (ref 3.5–5.1)
PROT SERPL-MCNC: 5.5 GM/DL (ref 6–8.4)
RBC # BLD AUTO: 4.78 M/UL (ref 4.6–6.2)
RELATIVE EOSINOPHIL (OHS): 0 %
RELATIVE LYMPHOCYTE (OHS): 12.8 % (ref 18–48)
RELATIVE MONOCYTE (OHS): 6.7 % (ref 4–15)
RELATIVE NEUTROPHIL (OHS): 79.1 % (ref 38–73)
RV AG STL QL IA.RAPID: NEGATIVE
SODIUM SERPL-SCNC: 134 MMOL/L (ref 136–145)
WBC # BLD AUTO: 7.71 K/UL (ref 3.9–12.7)

## 2025-07-07 PROCEDURE — 80053 COMPREHEN METABOLIC PANEL: CPT

## 2025-07-07 PROCEDURE — 63600175 PHARM REV CODE 636 W HCPCS

## 2025-07-07 PROCEDURE — 85652 RBC SED RATE AUTOMATED: CPT

## 2025-07-07 PROCEDURE — 94761 N-INVAS EAR/PLS OXIMETRY MLT: CPT

## 2025-07-07 PROCEDURE — 25000003 PHARM REV CODE 250

## 2025-07-07 PROCEDURE — 83735 ASSAY OF MAGNESIUM: CPT

## 2025-07-07 PROCEDURE — 99223 1ST HOSP IP/OBS HIGH 75: CPT | Mod: 25,,, | Performed by: STUDENT IN AN ORGANIZED HEALTH CARE EDUCATION/TRAINING PROGRAM

## 2025-07-07 PROCEDURE — 85025 COMPLETE CBC W/AUTO DIFF WBC: CPT

## 2025-07-07 PROCEDURE — 86140 C-REACTIVE PROTEIN: CPT

## 2025-07-07 PROCEDURE — 36415 COLL VENOUS BLD VENIPUNCTURE: CPT

## 2025-07-07 PROCEDURE — 84100 ASSAY OF PHOSPHORUS: CPT

## 2025-07-07 PROCEDURE — 21400001 HC TELEMETRY ROOM

## 2025-07-07 PROCEDURE — 86141 C-REACTIVE PROTEIN HS: CPT

## 2025-07-07 RX ORDER — POTASSIUM CHLORIDE 750 MG/1
50 CAPSULE, EXTENDED RELEASE ORAL ONCE
Status: COMPLETED | OUTPATIENT
Start: 2025-07-07 | End: 2025-07-07

## 2025-07-07 RX ORDER — ELECTROLYTES/DEXTROSE
400 SOLUTION, ORAL ORAL
Status: DISCONTINUED | OUTPATIENT
Start: 2025-07-07 | End: 2025-07-09

## 2025-07-07 RX ORDER — TRIFAROTENE 50 UG/G
CREAM TOPICAL
COMMUNITY
Start: 2025-02-14

## 2025-07-07 RX ORDER — SODIUM CHLORIDE, SODIUM LACTATE, POTASSIUM CHLORIDE, CALCIUM CHLORIDE 600; 310; 30; 20 MG/100ML; MG/100ML; MG/100ML; MG/100ML
INJECTION, SOLUTION INTRAVENOUS CONTINUOUS
Status: DISCONTINUED | OUTPATIENT
Start: 2025-07-07 | End: 2025-07-07

## 2025-07-07 RX ORDER — DUTASTERIDE 0.5 MG/1
0.5 CAPSULE, LIQUID FILLED ORAL DAILY
COMMUNITY

## 2025-07-07 RX ORDER — POLYETHYLENE GLYCOL 3350, SODIUM SULFATE ANHYDROUS, SODIUM BICARBONATE, SODIUM CHLORIDE, POTASSIUM CHLORIDE 236; 22.74; 6.74; 5.86; 2.97 G/4L; G/4L; G/4L; G/4L; G/4L
4000 POWDER, FOR SOLUTION ORAL ONCE
Status: COMPLETED | OUTPATIENT
Start: 2025-07-07 | End: 2025-07-07

## 2025-07-07 RX ORDER — MINOXIDIL 2.5 MG/1
5 TABLET ORAL DAILY
COMMUNITY

## 2025-07-07 RX ORDER — SODIUM CHLORIDE, SODIUM LACTATE, POTASSIUM CHLORIDE, CALCIUM CHLORIDE 600; 310; 30; 20 MG/100ML; MG/100ML; MG/100ML; MG/100ML
INJECTION, SOLUTION INTRAVENOUS CONTINUOUS
Status: ACTIVE | OUTPATIENT
Start: 2025-07-07 | End: 2025-07-08

## 2025-07-07 RX ORDER — MUPIROCIN 20 MG/G
OINTMENT TOPICAL 2 TIMES DAILY
COMMUNITY
Start: 2025-06-16

## 2025-07-07 RX ORDER — SODIUM,POTASSIUM PHOSPHATES 280-250MG
2 POWDER IN PACKET (EA) ORAL
Status: DISPENSED | OUTPATIENT
Start: 2025-07-07 | End: 2025-07-07

## 2025-07-07 RX ADMIN — POTASSIUM & SODIUM PHOSPHATES POWDER PACK 280-160-250 MG 2 PACKET: 280-160-250 PACK at 01:07

## 2025-07-07 RX ADMIN — DICYCLOMINE HYDROCHLORIDE 10 MG: 10 CAPSULE ORAL at 09:07

## 2025-07-07 RX ADMIN — OXYCODONE 5 MG: 5 TABLET ORAL at 01:07

## 2025-07-07 RX ADMIN — OXYCODONE 5 MG: 5 TABLET ORAL at 08:07

## 2025-07-07 RX ADMIN — POTASSIUM & SODIUM PHOSPHATES POWDER PACK 280-160-250 MG 2 PACKET: 280-160-250 PACK at 11:07

## 2025-07-07 RX ADMIN — POTASSIUM & SODIUM PHOSPHATES POWDER PACK 280-160-250 MG 2 PACKET: 280-160-250 PACK at 05:07

## 2025-07-07 RX ADMIN — DICYCLOMINE HYDROCHLORIDE 10 MG: 10 CAPSULE ORAL at 11:07

## 2025-07-07 RX ADMIN — Medication 400 ML: at 08:07

## 2025-07-07 RX ADMIN — PIPERACILLIN SODIUM AND TAZOBACTAM SODIUM 4.5 G: 4; .5 INJECTION, POWDER, FOR SOLUTION INTRAVENOUS at 11:07

## 2025-07-07 RX ADMIN — POTASSIUM CHLORIDE 50 MEQ: 750 CAPSULE, EXTENDED RELEASE ORAL at 11:07

## 2025-07-07 RX ADMIN — Medication 400 ML: at 05:07

## 2025-07-07 RX ADMIN — Medication 400 ML: at 11:07

## 2025-07-07 RX ADMIN — Medication 400 ML: at 01:07

## 2025-07-07 RX ADMIN — OXYCODONE 5 MG: 5 TABLET ORAL at 05:07

## 2025-07-07 RX ADMIN — SODIUM CHLORIDE, POTASSIUM CHLORIDE, SODIUM LACTATE AND CALCIUM CHLORIDE: 600; 310; 30; 20 INJECTION, SOLUTION INTRAVENOUS at 06:07

## 2025-07-07 RX ADMIN — PIPERACILLIN SODIUM AND TAZOBACTAM SODIUM 4.5 G: 4; .5 INJECTION, POWDER, FOR SOLUTION INTRAVENOUS at 08:07

## 2025-07-07 RX ADMIN — PIPERACILLIN SODIUM AND TAZOBACTAM SODIUM 4.5 G: 4; .5 INJECTION, POWDER, FOR SOLUTION INTRAVENOUS at 01:07

## 2025-07-07 RX ADMIN — POLYETHYLENE GLYCOL 3350, SODIUM SULFATE ANHYDROUS, SODIUM BICARBONATE, SODIUM CHLORIDE, POTASSIUM CHLORIDE 4000 ML: 236; 22.74; 6.74; 5.86; 2.97 POWDER, FOR SOLUTION ORAL at 09:07

## 2025-07-07 NOTE — PLAN OF CARE
07/07/25 1628   Post-Acute Status   Post-Acute Authorization Other   Other Status No Post-Acute Service Needs   Discharge Delays None known at this time   Discharge Plan   Discharge Plan A Home with family   Discharge Plan B Home     Davis met with pt and family at bedside. Pt's mother reported that there are no needs at this time.     Discharge Plan A and Plan B have been determined by review of patient's clinical status, future medical and therapeutic needs, and coverage/benefits for post-acute care in coordination with multidisciplinary team members.    DAVIS Oconnor  Case Management  [phone]

## 2025-07-07 NOTE — PROGRESS NOTES
[facility] Medicine  Progress Note    Patient Name: Neel Soliman  MRN: [id number]  Patient Class: IP- Inpatient   Admission Date: 7/5/2025  Length of Stay: 2 days  Attending Physician: Eren Smiley MD  Primary Care Provider: Lakshmi Callahan MD        Subjective     Principal Problem:Diarrhea        HPI:  Patient is a 38 y/o WM presenting for acute onset abdominal pain, fever, and diarrheal illness. Most of the history is provided by his mother, who is accompanying him. He began having diarrhea on 7/4 with approximately 12 loose bowel movements at home. He was brought in by his mother and father who were concerned about his illness. His mother reports several instances of similar diarrheal illnesses in the past, some of which have required hospitalization. She reports that the disturbances can sometimes coincide with spicy food but are oftentimes random. She has a personal history of pancreatic insufficiency and requires dietary enzyme supplementation. She says that he was working all day prior to the onset of the symptoms, and has been working outside in a fruit garden recently. He has an extensive travel history, and has recently returned from two long-distance flights within the continental [place]. He frequently travels back and forth between [place] and a residence in [facility]. He has taken the same pancreatic supplements as his mother in the past and she reports mixed improvement in his symptoms. He has no pets currently. 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    Overview/Hospital Course:  Patient presenting with diarrheal illness. Given aggressive oral/IV rehydration therapy. Infectious vs autoimmune workup ordered. Patient was placed on bland diet to encourage oral intake. Symptomatic care.    Interval History: NAEON. VSS. Patient reporting decreasing frequency of bowel movements. Stool now with some light red blood and mucous.  Bentyl and oxycodone helping. Fever curve downtrending.    Review of Systems  Objective:     Vital Signs (Most Recent):  Temp: 98.8 °F (37.1 °C) (07/07/25 1201)  Pulse: 100 (07/07/25 1526)  Resp: 18 (07/07/25 1332)  BP: 109/69 (07/07/25 1201)  SpO2: 98 % (07/07/25 1201) Vital Signs (24h Range):  Temp:  [98.8 °F (37.1 °C)-100.5 °F (38.1 °C)] 98.8 °F (37.1 °C)  Pulse:  [] 100  Resp:  [17-20] 18  SpO2:  [97 %-99 %] 98 %  BP: (100-119)/(52-69) 109/69     Weight: 70.3 kg (155 lb)  Body mass index is 21.62 kg/m².    Intake/Output Summary (Last 24 hours) at 7/7/2025 1540  Last data filed at 7/7/2025 1126  Gross per 24 hour   Intake 1600 ml   Output 1300 ml   Net 300 ml         Physical Exam  Constitutional:       General: He is in acute distress.      Appearance: He is ill-appearing and diaphoretic.   HENT:      Head: Normocephalic and atraumatic.      Nose: Nose normal.      Mouth/Throat:      Pharynx: Oropharynx is clear.   Eyes:      Extraocular Movements: Extraocular movements intact.      Conjunctiva/sclera: Conjunctivae normal.      Pupils: Pupils are equal, round, and reactive to light.   Cardiovascular:      Rate and Rhythm: Normal rate and regular rhythm.      Pulses: Normal pulses.      Heart sounds: Normal heart sounds.   Pulmonary:      Effort: Pulmonary effort is normal.      Breath sounds: Normal breath sounds.   Abdominal:      General: Abdomen is flat. There is no distension.      Palpations: Abdomen is soft.      Tenderness: There is abdominal tenderness. There is no guarding.      Comments: Pan-abdominal tenderness.    Musculoskeletal:         General: Normal range of motion.      Cervical back: Normal range of motion and neck supple.   Skin:     General: Skin is warm.      Coloration: Skin is pale.   Neurological:      General: No focal deficit present.      Mental Status: He is alert and oriented to person, place, and time.      Motor: Weakness present.   Psychiatric:         Mood and Affect: Mood  normal.         Behavior: Behavior normal.               Significant Labs: All pertinent labs within the past 24 hours have been reviewed.    Significant Imaging: I have reviewed all pertinent imaging results/findings within the past 24 hours.      Assessment & Plan  Diarrhea  Dehydration  Diarrheal illness complicated by tachycardia, fever, and dehydration.  - dc Vanc, continue Zosyn  - C diff/Rotavirus/Crypto/Giardia ordered  - Aggressive IV/Po rehydration with LR therapy initiated  - BCX NGTD, UA unremarkable  - Following stool studies  - GI consulted given unclear clinical picture and lab interpretation  - GI pathogen panel  VTE Risk Mitigation (From admission, onward)           Ordered     IP VTE LOW RISK PATIENT  Once         07/05/25 1825     Place sequential compression device  Until discontinued         07/05/25 1825                    Discharge Planning   CALEB: 7/8/2025     Code Status: Full Code   Medical Readiness for Discharge Date:   Discharge Plan A: Home          Prem Sol MD  Department of Hospital Medicine   [facility] Surg

## 2025-07-07 NOTE — CONSULTS
"[facility] Surg  Gastroenterology  Consult Note    Patient Name: Neel Soliman  MRN: [id number]  Admission Date: 7/5/2025  Hospital Length of Stay: 2 days  Code Status: Full Code   Attending Provider: Eren Smiley MD   Consulting Provider: Annie Sykes MD  Primary Care Physician: Lakshmi aCllahan MD  Principal Problem:Diarrhea    Inpatient consult to Gastroenterology  Consult performed by: Annie Sykes MD  Consult ordered by: Prem Pa MD        Subjective:     HPI:  39M w/ no significant PMH, MSM on Truvada, presents with to ED w/ CC of diarrhea, >12BMs per day.  GI consulted "Guidance with lab interpretation (initially concerning for infectious diarrhea, results coming back negative. pancreatic elastase low, calprotectin very elevated).    Patient stated diarrhea (non bloody and w/o mucous) started about 4 days ago and can not determine any triggering factors. Patient reports about 40 BM in the last 4 days and now today has turned bloody. Patient states last meal was day old burger in fridge and salad that night diarrhea started. Accompanying factors include fever and diffuse abdominal pain. 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 Off note, significant medical hx includes mom w/ pancreatic insufficiency, aunt w/ pancreatic cancer, and grandmother with IBD. Patient/mom states he may have EGD in past done for "low appetite" but states he cannot remember when/or the results. Patient has never had a colonoscopy before.     History reviewed. No pertinent past medical history.    History reviewed. No pertinent surgical history.    Review of patient's allergies indicates:  No Known Allergies  Family History       Problem Relation (Age of Onset)    Heart attack Maternal Grandfather    Hyperlipidemia " "Mother, Father          Tobacco Use    Smoking status: Former     Current packs/day: 1.00     Average packs/day: 1 pack/day for 3.0 years (3.0 ttl pk-yrs)     Types: Cigarettes    Smokeless tobacco: Never   Substance and Sexual Activity    Alcohol use: No    Drug use: No    Sexual activity: Yes     Partners: Male     Review of Systems   Constitutional:  Positive for fever.   Gastrointestinal:  Positive for abdominal pain, blood in stool and diarrhea. Negative for vomiting.     Objective:     Vital Signs (Most Recent):  Temp: 98.8 °F (37.1 °C) (07/07/25 1201)  Pulse: 100 (07/07/25 1526)  Resp: 18 (07/07/25 1332)  BP: 109/69 (07/07/25 1201)  SpO2: 98 % (07/07/25 1201) Vital Signs (24h Range):  Temp:  [98.8 °F (37.1 °C)-100.5 °F (38.1 °C)] 98.8 °F (37.1 °C)  Pulse:  [] 100  Resp:  [17-20] 18  SpO2:  [97 %-99 %] 98 %  BP: (100-119)/(52-69) 109/69     Weight: 70.3 kg (155 lb) (07/05/25 1159)  Body mass index is 21.62 kg/m².      Intake/Output Summary (Last 24 hours) at 7/7/2025 1632  Last data filed at 7/7/2025 1126  Gross per 24 hour   Intake 1600 ml   Output 1300 ml   Net 300 ml       Lines/Drains/Airways       Peripheral Intravenous Line  Duration             Peripheral IV Single Lumen 07/05/25 1304 18 G Left Forearm 2 days                     Physical Exam  Cardiovascular:      Rate and Rhythm: Normal rate.   Pulmonary:      Effort: Pulmonary effort is normal.   Abdominal:      General: Abdomen is flat. There is no distension.      Palpations: Abdomen is soft.      Tenderness: There is abdominal tenderness.      Comments: Diffusely TTP   Skin:     General: Skin is warm.   Neurological:      Mental Status: He is oriented to person, place, and time.          Significant Labs:  Acute Hepatitis Panel: No results for input(s): "HEPBSAG", "HEPAIGM", "HEPCAB" in the last 48 hours.    Invalid input(s): "HAPBIGM"  Amylase: No results for input(s): "AMYLASE" in the last 48 hours.  CBC:   Recent Labs   Lab 07/06/25  0911 " "07/07/25  0502   WBC 4.65 7.71   HGB 15.1 14.1   HCT 44.5 41.0    148*     CMP:   Recent Labs   Lab 07/07/25  0502   GLU 94   CALCIUM 8.1*   ALBUMIN 2.7*   PROT 5.5*   *   K 3.7   CO2 24      BUN 10   CREATININE 0.9   ALKPHOS 53   ALT 36   AST 16   BILITOT 1.2*     CRP:   Recent Labs   Lab 07/07/25  0502   .3*     ESR:   Recent Labs   Lab 07/07/25  0502   SEDRATE 27*     Liver Function Test:   Recent Labs   Lab 07/06/25  0911 07/07/25  0502   ALT 45* 36   AST 23 16   ALKPHOS 42 53   BILITOT 1.7* 1.2*   PROT 5.1* 5.5*   ALBUMIN 2.7* 2.7*     Stool C. diff:   Recent Labs   Lab 07/05/25 1911   CDIFFICILEAN Negative   CDIFFTOX Negative     Stool Giardia/Crypto:   Recent Labs   Lab 07/05/25 1911   GIARDIAANTIG Negative   CRSPAG Negative     Stool WBCs:   Recent Labs   Lab 07/05/25 1911   STOOLWBC Many neutrophils seen*       Significant Imaging:  CTAP (7/5/25):   1. Liquid stool throughout the large bowel noting a few fluid-filled distal small bowel loops.  Taken together, findings suggest diarrheal illness, possibly on the basis of infectious or inflammatory enteritis.  Correlation is advised.  2. There are a few scattered mesenteric lymph nodes, may reflect reactive change.  3. Hepatomegaly, correlation with LFTs recommended.  Assessment/Plan:     GI  * Diarrhea  39M w/ no significant PMH, MSM on Truvada, presents with to ED w/ CC of diarrhea (now bloody), >12BMs per day.  GI consulted "Guidance with lab interpretation (initially concerning for infectious diarrhea, results coming back negative. pancreatic elastase low, calprotectin very elevated). CTAP w/ "Liquid stool throughout the large bowel noting a few fluid-filled distal small bowel loops. Taken together, findings suggest diarrheal illness, possibly on the basis of infectious or inflammatory enteritis, scattered mesenteric lymph nodes, may reflect reactive change. Hepatomegaly"  ESR high 27  CRP high 266  Pancreatic elastase low, " 4.35  Clint protectin stool high, 1500  E coli, Camyplobacter, C diff , Giardia, Crypto, Rotavirus Negative      Plan:  -F/u Stool studies and GI pathogen panel  -- Plan for Colonoscopy tomorrow on 7/8/25  -- Clear liquid diet today and NPO after midnight  - Bedside commode and Tuck's wipes to bedside  - Golytely 1 gallon start at 6pm and complete  - Please check stool at 5 AM prior to procedure, if not clear, order a bottle of mag citrate, make sure this is not administered past 6AM  - Please ensure Hgb >7 and plts >50           Thank you for your consult. I will follow-up with patient. Please contact us if you have any additional questions.    Annie Sykes MD  Gastroenterology  [facility] - Med Surg

## 2025-07-07 NOTE — PHARMACY MED REC
"Admission Medication History     The home medication history was taken by Helene Sandy.    You may go to "Admission" then "Reconcile Home Medications" tabs to review and/or act upon these items.     The home medication list has been updated by the Pharmacy department.   Please read ALL comments highlighted in yellow.   Please address this information as you see fit.    Feel free to contact us if you have any questions or require assistance.        Medications listed below were obtained from: Patient/family and Analytic software- [employer]  PTA Medications   Medication Sig    AKLIEF 0.005 % Crea Apply a pea sized amount to whole face once nightly.       dutasteride (AVODART) 0.5 mg capsule Take 0.5 mg by mouth once daily.        minoxidiL (LONITEN) 2.5 MG tablet Take 5 mg by mouth once daily.        mupirocin (BACTROBAN) 2 % ointment Apply topically 2 (two) times daily.       Potential issues to be addressed PRIOR TO DISCHARGE  Patient requires education regarding drug therapies     Helene Sandy                 .          "

## 2025-07-07 NOTE — ASSESSMENT & PLAN NOTE
Onset:  Episodes   Blood or mucus in stool   Dietary changes  Medication changes   Fever   Chills   Abdominal Pain   Recent abx use   Occupation

## 2025-07-07 NOTE — HPI
"39M w/ no significant PMH, MSM on Truvada, presents with to ED w/ CC of diarrhea, >12BMs per day.  GI consulted "Guidance with lab interpretation (initially concerning for infectious diarrhea, results coming back negative. pancreatic elastase low, calprotectin very elevated).    Patient stated diarrhea (non bloody and w/o mucous) started about 4 days ago and can not determine any triggering factors. Patient reports about 40 BM in the last 4 days and now today has turned bloody. Patient states last meal was day old burger in fridge and salad that night diarrhea started. Accompanying factors include fever and diffuse abdominal pain. 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 Off note, significant medical hx includes mom w/ pancreatic insufficiency, aunt w/ pancreatic cancer, and grandmother with IBD. Patient/mom states he may have EGD in past done for "low appetite" but states he cannot remember when/or the results. Patient has never had a colonoscopy before.   "

## 2025-07-07 NOTE — SUBJECTIVE & OBJECTIVE
Interval History: NAEON. VSS. Patient reporting decreasing frequency of bowel movements. Stool now with some light red blood and mucous. Bentyl and oxycodone helping. Fever curve downtrending.    Review of Systems  Objective:     Vital Signs (Most Recent):  Temp: 98.8 °F (37.1 °C) (07/07/25 1201)  Pulse: 100 (07/07/25 1526)  Resp: 18 (07/07/25 1332)  BP: 109/69 (07/07/25 1201)  SpO2: 98 % (07/07/25 1201) Vital Signs (24h Range):  Temp:  [98.8 °F (37.1 °C)-100.5 °F (38.1 °C)] 98.8 °F (37.1 °C)  Pulse:  [] 100  Resp:  [17-20] 18  SpO2:  [97 %-99 %] 98 %  BP: (100-119)/(52-69) 109/69     Weight: 70.3 kg (155 lb)  Body mass index is 21.62 kg/m².    Intake/Output Summary (Last 24 hours) at 7/7/2025 1540  Last data filed at 7/7/2025 1126  Gross per 24 hour   Intake 1600 ml   Output 1300 ml   Net 300 ml         Physical Exam  Constitutional:       General: He is in acute distress.      Appearance: He is ill-appearing and diaphoretic.   HENT:      Head: Normocephalic and atraumatic.      Nose: Nose normal.      Mouth/Throat:      Pharynx: Oropharynx is clear.   Eyes:      Extraocular Movements: Extraocular movements intact.      Conjunctiva/sclera: Conjunctivae normal.      Pupils: Pupils are equal, round, and reactive to light.   Cardiovascular:      Rate and Rhythm: Normal rate and regular rhythm.      Pulses: Normal pulses.      Heart sounds: Normal heart sounds.   Pulmonary:      Effort: Pulmonary effort is normal.      Breath sounds: Normal breath sounds.   Abdominal:      General: Abdomen is flat. There is no distension.      Palpations: Abdomen is soft.      Tenderness: There is abdominal tenderness. There is no guarding.      Comments: Pan-abdominal tenderness.    Musculoskeletal:         General: Normal range of motion.      Cervical back: Normal range of motion and neck supple.   Skin:     General: Skin is warm.      Coloration: Skin is pale.   Neurological:      General: No focal deficit present.       Mental Status: He is alert and oriented to person, place, and time.      Motor: Weakness present.   Psychiatric:         Mood and Affect: Mood normal.         Behavior: Behavior normal.               Significant Labs: All pertinent labs within the past 24 hours have been reviewed.    Significant Imaging: I have reviewed all pertinent imaging results/findings within the past 24 hours.

## 2025-07-07 NOTE — ASSESSMENT & PLAN NOTE
Diarrheal illness complicated by tachycardia, fever, and dehydration.  - dc Vanc, continue Zosyn  - C diff/Rotavirus/Crypto/Giardia ordered  - Aggressive IV/Po rehydration with LR therapy initiated  - BCX NGTD, UA unremarkable  - Following stool studies  - GI consulted given unclear clinical picture and lab interpretation  - GI pathogen panel

## 2025-07-07 NOTE — SUBJECTIVE & OBJECTIVE
History reviewed. No pertinent past medical history.    History reviewed. No pertinent surgical history.    Review of patient's allergies indicates:  No Known Allergies  Family History       Problem Relation (Age of Onset)    Heart attack Maternal Grandfather    Hyperlipidemia Mother, Father          Tobacco Use    Smoking status: Former     Current packs/day: 1.00     Average packs/day: 1 pack/day for 3.0 years (3.0 ttl pk-yrs)     Types: Cigarettes    Smokeless tobacco: Never   Substance and Sexual Activity    Alcohol use: No    Drug use: No    Sexual activity: Yes     Partners: Male     Review of Systems   Constitutional:  Positive for fever.   Gastrointestinal:  Positive for abdominal pain, blood in stool and diarrhea. Negative for vomiting.     Objective:     Vital Signs (Most Recent):  Temp: 98.8 °F (37.1 °C) (07/07/25 1201)  Pulse: 100 (07/07/25 1526)  Resp: 18 (07/07/25 1332)  BP: 109/69 (07/07/25 1201)  SpO2: 98 % (07/07/25 1201) Vital Signs (24h Range):  Temp:  [98.8 °F (37.1 °C)-100.5 °F (38.1 °C)] 98.8 °F (37.1 °C)  Pulse:  [] 100  Resp:  [17-20] 18  SpO2:  [97 %-99 %] 98 %  BP: (100-119)/(52-69) 109/69     Weight: 70.3 kg (155 lb) (07/05/25 1159)  Body mass index is 21.62 kg/m².      Intake/Output Summary (Last 24 hours) at 7/7/2025 1632  Last data filed at 7/7/2025 1126  Gross per 24 hour   Intake 1600 ml   Output 1300 ml   Net 300 ml       Lines/Drains/Airways       Peripheral Intravenous Line  Duration             Peripheral IV Single Lumen 07/05/25 1304 18 G Left Forearm 2 days                     Physical Exam  Cardiovascular:      Rate and Rhythm: Normal rate.   Pulmonary:      Effort: Pulmonary effort is normal.   Abdominal:      General: Abdomen is flat. There is no distension.      Palpations: Abdomen is soft.      Tenderness: There is abdominal tenderness.      Comments: Diffusely TTP   Skin:     General: Skin is warm.   Neurological:      Mental Status: He is oriented to person, place,  "and time.          Significant Labs:  Acute Hepatitis Panel: No results for input(s): "HEPBSAG", "HEPAIGM", "HEPCAB" in the last 48 hours.    Invalid input(s): "HAPBIGM"  Amylase: No results for input(s): "AMYLASE" in the last 48 hours.  CBC:   Recent Labs   Lab 07/06/25  0911 07/07/25  0502   WBC 4.65 7.71   HGB 15.1 14.1   HCT 44.5 41.0    148*     CMP:   Recent Labs   Lab 07/07/25  0502   GLU 94   CALCIUM 8.1*   ALBUMIN 2.7*   PROT 5.5*   *   K 3.7   CO2 24      BUN 10   CREATININE 0.9   ALKPHOS 53   ALT 36   AST 16   BILITOT 1.2*     CRP:   Recent Labs   Lab 07/07/25  0502   .3*     ESR:   Recent Labs   Lab 07/07/25  0502   SEDRATE 27*     Liver Function Test:   Recent Labs   Lab 07/06/25  0911 07/07/25  0502   ALT 45* 36   AST 23 16   ALKPHOS 42 53   BILITOT 1.7* 1.2*   PROT 5.1* 5.5*   ALBUMIN 2.7* 2.7*     Stool C. diff:   Recent Labs   Lab 07/05/25 1911   CDIFFICILEAN Negative   CDIFFTOX Negative     Stool Giardia/Crypto:   Recent Labs   Lab 07/05/25 1911   GIARDIAANTIG Negative   CRSPAG Negative     Stool WBCs:   Recent Labs   Lab 07/05/25 1911   STOOLWBC Many neutrophils seen*       Significant Imaging:  CTAP (7/5/25):   1. Liquid stool throughout the large bowel noting a few fluid-filled distal small bowel loops.  Taken together, findings suggest diarrheal illness, possibly on the basis of infectious or inflammatory enteritis.  Correlation is advised.  2. There are a few scattered mesenteric lymph nodes, may reflect reactive change.  3. Hepatomegaly, correlation with LFTs recommended.  "

## 2025-07-07 NOTE — ASSESSMENT & PLAN NOTE
"39M w/ no significant PMH, MSM on Truvada, presents with to ED w/ CC of diarrhea (now bloody), >12BMs per day.  GI consulted "Guidance with lab interpretation (initially concerning for infectious diarrhea, results coming back negative. pancreatic elastase low, calprotectin very elevated). CTAP w/ "Liquid stool throughout the large bowel noting a few fluid-filled distal small bowel loops. Taken together, findings suggest diarrheal illness, possibly on the basis of infectious or inflammatory enteritis, scattered mesenteric lymph nodes, may reflect reactive change. Hepatomegaly."    Plan:  -- Awaiting pathology from colon biopsies to further determine treatment. If patient discharged, can arrange treatment and followup necessary.   -- OK to transition antibiotics to PO if ok per primary team  -- Diet as tolerated     "

## 2025-07-07 NOTE — ASSESSMENT & PLAN NOTE
"39M w/ no significant PMH, MSM on Truvada, presents with to ED w/ CC of diarrhea (now bloody), >12BMs per day.  GI consulted "Guidance with lab interpretation (initially concerning for infectious diarrhea, results coming back negative. pancreatic elastase low, calprotectin very elevated). CTAP w/ "Liquid stool throughout the large bowel noting a few fluid-filled distal small bowel loops. Taken together, findings suggest diarrheal illness, possibly on the basis of infectious or inflammatory enteritis, scattered mesenteric lymph nodes, may reflect reactive change. Hepatomegaly"  ESR high 27  CRP high 266  Pancreatic elastase low, 4.35  Clint protectin stool high, 1500  E coli, Camyplobacter, C diff , Giardia, Crypto, Rotavirus Negative      Plan:  -F/u Stool studies and GI pathogen panel  -- Plan for Colonoscopy tomorrow on 7/8/25  -- Clear liquid diet today and NPO after midnight  - Bedside commode and Tuck's wipes to bedside  - Golytely 1 gallon:   1/2 gallon between 6-8pm (8oz q15min)   1/2 gallon between 4-6am (8oz until complete)  - Please check stool at 5 AM prior to procedure, if not clear, order a bottle of mag citrate, make sure this is not administered past 6AM  - Please ensure Hgb >7 and plts >50     "

## 2025-07-08 ENCOUNTER — ANESTHESIA EVENT (OUTPATIENT)
Dept: ENDOSCOPY | Facility: HOSPITAL | Age: 40
End: 2025-07-08
Payer: COMMERCIAL

## 2025-07-08 ENCOUNTER — ANESTHESIA (OUTPATIENT)
Dept: ENDOSCOPY | Facility: HOSPITAL | Age: 40
End: 2025-07-08
Payer: COMMERCIAL

## 2025-07-08 LAB
ABSOLUTE EOSINOPHIL (OHS): 0.03 K/UL
ABSOLUTE MONOCYTE (OHS): 0.56 K/UL (ref 0.3–1)
ABSOLUTE NEUTROPHIL COUNT (OHS): 5.28 K/UL (ref 1.8–7.7)
ALBUMIN SERPL BCP-MCNC: 2.7 G/DL (ref 3.5–5.2)
ALP SERPL-CCNC: 48 UNIT/L (ref 40–150)
ALT SERPL W/O P-5'-P-CCNC: 26 UNIT/L (ref 10–44)
ANION GAP (OHS): 7 MMOL/L (ref 8–16)
AST SERPL-CCNC: 11 UNIT/L (ref 11–45)
BASOPHILS # BLD AUTO: 0.03 K/UL
BASOPHILS NFR BLD AUTO: 0.4 %
BILIRUB SERPL-MCNC: 1 MG/DL (ref 0.1–1)
BUN SERPL-MCNC: 7 MG/DL (ref 6–20)
C TRACH RRNA SPEC QL NAA+PROBE: NEGATIVE
C TRACH RRNA SPEC QL NAA+PROBE: NEGATIVE
CALCIUM SERPL-MCNC: 7.9 MG/DL (ref 8.7–10.5)
CHLORIDE SERPL-SCNC: 106 MMOL/L (ref 95–110)
CO2 SERPL-SCNC: 24 MMOL/L (ref 23–29)
CREAT SERPL-MCNC: 0.9 MG/DL (ref 0.5–1.4)
ERYTHROCYTE [DISTWIDTH] IN BLOOD BY AUTOMATED COUNT: 12.9 % (ref 11.5–14.5)
GFR SERPLBLD CREATININE-BSD FMLA CKD-EPI: >60 ML/MIN/1.73/M2
GLUCOSE SERPL-MCNC: 84 MG/DL (ref 70–110)
HCT VFR BLD AUTO: 38.1 % (ref 40–54)
HGB BLD-MCNC: 12.6 GM/DL (ref 14–18)
HOLD SPECIMEN: NORMAL
IMM GRANULOCYTES # BLD AUTO: 0.03 K/UL (ref 0–0.04)
IMM GRANULOCYTES NFR BLD AUTO: 0.4 % (ref 0–0.5)
LYMPHOCYTES # BLD AUTO: 1.55 K/UL (ref 1–4.8)
MAGNESIUM SERPL-MCNC: 1.9 MG/DL (ref 1.6–2.6)
MCH RBC QN AUTO: 28.8 PG (ref 27–31)
MCHC RBC AUTO-ENTMCNC: 33.1 G/DL (ref 32–36)
MCV RBC AUTO: 87 FL (ref 82–98)
N GONORRHOEA RRNA SPEC QL NAA+PROBE: NEGATIVE
N GONORRHOEA RRNA SPEC QL NAA+PROBE: NEGATIVE
NUCLEATED RBC (/100WBC) (OHS): 0 /100 WBC
PHOSPHATE SERPL-MCNC: 1.4 MG/DL (ref 2.7–4.5)
PLATELET # BLD AUTO: 158 K/UL (ref 150–450)
PMV BLD AUTO: 9.7 FL (ref 9.2–12.9)
POTASSIUM SERPL-SCNC: 3.6 MMOL/L (ref 3.5–5.1)
PROT SERPL-MCNC: 5.4 GM/DL (ref 6–8.4)
RBC # BLD AUTO: 4.38 M/UL (ref 4.6–6.2)
RELATIVE EOSINOPHIL (OHS): 0.4 %
RELATIVE LYMPHOCYTE (OHS): 20.7 % (ref 18–48)
RELATIVE MONOCYTE (OHS): 7.5 % (ref 4–15)
RELATIVE NEUTROPHIL (OHS): 70.6 % (ref 38–73)
SODIUM SERPL-SCNC: 137 MMOL/L (ref 136–145)
SPECIMEN SOURCE: NORMAL
WBC # BLD AUTO: 7.48 K/UL (ref 3.9–12.7)

## 2025-07-08 PROCEDURE — 63600175 PHARM REV CODE 636 W HCPCS: Performed by: NURSE ANESTHETIST, CERTIFIED REGISTERED

## 2025-07-08 PROCEDURE — 25000003 PHARM REV CODE 250: Performed by: NURSE ANESTHETIST, CERTIFIED REGISTERED

## 2025-07-08 PROCEDURE — 27201012 HC FORCEPS, HOT/COLD, DISP: Performed by: STUDENT IN AN ORGANIZED HEALTH CARE EDUCATION/TRAINING PROGRAM

## 2025-07-08 PROCEDURE — 83735 ASSAY OF MAGNESIUM: CPT

## 2025-07-08 PROCEDURE — 37000008 HC ANESTHESIA 1ST 15 MINUTES: Performed by: STUDENT IN AN ORGANIZED HEALTH CARE EDUCATION/TRAINING PROGRAM

## 2025-07-08 PROCEDURE — 45380 COLONOSCOPY AND BIOPSY: CPT | Mod: ,,, | Performed by: STUDENT IN AN ORGANIZED HEALTH CARE EDUCATION/TRAINING PROGRAM

## 2025-07-08 PROCEDURE — 45380 COLONOSCOPY AND BIOPSY: CPT | Performed by: STUDENT IN AN ORGANIZED HEALTH CARE EDUCATION/TRAINING PROGRAM

## 2025-07-08 PROCEDURE — 21400001 HC TELEMETRY ROOM

## 2025-07-08 PROCEDURE — 0DBB8ZX EXCISION OF ILEUM, VIA NATURAL OR ARTIFICIAL OPENING ENDOSCOPIC, DIAGNOSTIC: ICD-10-PCS | Performed by: STUDENT IN AN ORGANIZED HEALTH CARE EDUCATION/TRAINING PROGRAM

## 2025-07-08 PROCEDURE — 0DBP8ZX EXCISION OF RECTUM, VIA NATURAL OR ARTIFICIAL OPENING ENDOSCOPIC, DIAGNOSTIC: ICD-10-PCS | Performed by: STUDENT IN AN ORGANIZED HEALTH CARE EDUCATION/TRAINING PROGRAM

## 2025-07-08 PROCEDURE — 37000009 HC ANESTHESIA EA ADD 15 MINS: Performed by: STUDENT IN AN ORGANIZED HEALTH CARE EDUCATION/TRAINING PROGRAM

## 2025-07-08 PROCEDURE — 88341 IMHCHEM/IMCYTCHM EA ADD ANTB: CPT | Mod: TC | Performed by: STUDENT IN AN ORGANIZED HEALTH CARE EDUCATION/TRAINING PROGRAM

## 2025-07-08 PROCEDURE — 94761 N-INVAS EAR/PLS OXIMETRY MLT: CPT

## 2025-07-08 PROCEDURE — 85025 COMPLETE CBC W/AUTO DIFF WBC: CPT

## 2025-07-08 PROCEDURE — 63600175 PHARM REV CODE 636 W HCPCS

## 2025-07-08 PROCEDURE — 25000003 PHARM REV CODE 250

## 2025-07-08 PROCEDURE — 36415 COLL VENOUS BLD VENIPUNCTURE: CPT

## 2025-07-08 PROCEDURE — 80053 COMPREHEN METABOLIC PANEL: CPT

## 2025-07-08 PROCEDURE — 0DBM8ZX EXCISION OF DESCENDING COLON, VIA NATURAL OR ARTIFICIAL OPENING ENDOSCOPIC, DIAGNOSTIC: ICD-10-PCS | Performed by: STUDENT IN AN ORGANIZED HEALTH CARE EDUCATION/TRAINING PROGRAM

## 2025-07-08 PROCEDURE — 0DBN8ZX EXCISION OF SIGMOID COLON, VIA NATURAL OR ARTIFICIAL OPENING ENDOSCOPIC, DIAGNOSTIC: ICD-10-PCS | Performed by: STUDENT IN AN ORGANIZED HEALTH CARE EDUCATION/TRAINING PROGRAM

## 2025-07-08 PROCEDURE — 84100 ASSAY OF PHOSPHORUS: CPT

## 2025-07-08 RX ORDER — SODIUM CHLORIDE 0.9 % (FLUSH) 0.9 %
3 SYRINGE (ML) INJECTION
Status: DISCONTINUED | OUTPATIENT
Start: 2025-07-08 | End: 2025-07-08 | Stop reason: HOSPADM

## 2025-07-08 RX ORDER — PROPOFOL 10 MG/ML
VIAL (ML) INTRAVENOUS
Status: DISCONTINUED | OUTPATIENT
Start: 2025-07-08 | End: 2025-07-08

## 2025-07-08 RX ORDER — GLUCAGON 1 MG
1 KIT INJECTION
Status: DISCONTINUED | OUTPATIENT
Start: 2025-07-08 | End: 2025-07-08 | Stop reason: HOSPADM

## 2025-07-08 RX ORDER — LIDOCAINE HYDROCHLORIDE 20 MG/ML
INJECTION INTRAVENOUS
Status: DISCONTINUED | OUTPATIENT
Start: 2025-07-08 | End: 2025-07-08

## 2025-07-08 RX ADMIN — Medication 400 ML: at 08:07

## 2025-07-08 RX ADMIN — PROPOFOL 150 MCG/KG/MIN: 10 INJECTION, EMULSION INTRAVENOUS at 12:07

## 2025-07-08 RX ADMIN — PROPOFOL 80 MG: 10 INJECTION, EMULSION INTRAVENOUS at 12:07

## 2025-07-08 RX ADMIN — SODIUM CHLORIDE: 9 INJECTION, SOLUTION INTRAVENOUS at 12:07

## 2025-07-08 RX ADMIN — LIDOCAINE HYDROCHLORIDE 100 MG: 20 INJECTION INTRAVENOUS at 12:07

## 2025-07-08 RX ADMIN — OXYCODONE 5 MG: 5 TABLET ORAL at 08:07

## 2025-07-08 RX ADMIN — PIPERACILLIN SODIUM AND TAZOBACTAM SODIUM 4.5 G: 4; .5 INJECTION, POWDER, FOR SOLUTION INTRAVENOUS at 08:07

## 2025-07-08 RX ADMIN — OXYCODONE 5 MG: 5 TABLET ORAL at 09:07

## 2025-07-08 RX ADMIN — POTASSIUM PHOSPHATE, MONOBASIC AND POTASSIUM PHOSPHATE, DIBASIC 30 MMOL: 224; 236 INJECTION, SOLUTION, CONCENTRATE INTRAVENOUS at 08:07

## 2025-07-08 RX ADMIN — PIPERACILLIN SODIUM AND TAZOBACTAM SODIUM 4.5 G: 4; .5 INJECTION, POWDER, FOR SOLUTION INTRAVENOUS at 03:07

## 2025-07-08 NOTE — NURSING
Pt drank 50% of golytely, still drinking. Order says okay to drink past midnight if needed. Stool clearing up. Will continue to monitor.

## 2025-07-08 NOTE — TRANSFER OF CARE
"Anesthesia Transfer of Care Note    Patient: Neel Soliman    Procedure(s) Performed: Procedure(s) (LRB):  COLONOSCOPY (N/A)    Patient location: PACU    Anesthesia Type: general    Transport from OR: Transported from OR on room air with adequate spontaneous ventilation    Post pain: adequate analgesia    Post assessment: no apparent anesthetic complications and tolerated procedure well    Post vital signs: stable    Level of consciousness: awake and alert    Nausea/Vomiting: no nausea/vomiting    Complications: none    Transfer of care protocol was followed      Last vitals: Visit Vitals  /77   Pulse 71   Temp 36.7 °C (98.1 °F) (Temporal)   Resp 18   Ht 5' 11" (1.803 m)   Wt 70.3 kg (155 lb)   SpO2 100%   BMI 21.62 kg/m²     "

## 2025-07-08 NOTE — DISCHARGE INSTRUCTIONS
Our goal at [facility] is to always give you outstanding care and exceptional service. You may receive a survey from [employer] by mail, text or e-mail in the next 24-48 hours asking about the care you received with us. The survey should only take 5-10 minutes to complete and is very important to us.     Your feedback provides us with a way to recognize our staff who work tirelessly to provide the best care! Also, your responses help us learn how to improve when your experience was below our aspiration of excellence. We are always looking for ways to improve your stay. We WILL use your feedback to continue making improvements to help us provide the highest quality care. We keep your personal information and feedback confidential. We appreciate your time completing this survey and can't wait to hear from you!!!    We look forward to your continued care with us! Thanks so much for choosing [facility] for your healthcare needs!

## 2025-07-08 NOTE — ANESTHESIA PREPROCEDURE EVALUATION
"                                                                                                             07/08/2025  [facility]  Anesthesia Pre-Operative Evaluation         Patient Name: Neel Soliman  YOB: 1985  MRN: [id number]    SUBJECTIVE:     Pre-operative evaluation for Procedure(s) (LRB):  COLONOSCOPY (N/A)     07/08/2025    Neel Soliman is a 39 y.o. male w/ a pertinent PMHx of diarrhea here for c scope.      Full medical history listed below      LDA: None documented.    Prev airway: None documented.     GTTs: None documented.        Problem List[1]    Review of patient's allergies indicates:  No Known Allergies    Current Inpatient Medications:   electrolytes-dextrose  400 mL Oral Q4H WAKE    piperacillin-tazobactam (Zosyn) IV (PEDS and ADULTS) (extended infusion is not appropriate)  4.5 g Intravenous Q8H    potassium phosphate IVPB  30 mmol Intravenous Once       Medications Ordered Prior to Encounter[2]    History reviewed. No pertinent surgical history.    Social History[3]    OBJECTIVE:     Vital Signs Range (Last 24H):  Temp:  [36.4 °C (97.6 °F)-37.1 °C (98.8 °F)]   Pulse:  []   Resp:  [16-18]   BP: ()/(57-77)   SpO2:  [98 %-100 %]       CBC:   Recent Labs     07/07/25  0502 07/08/25 0227   WBC 7.71 7.48   RBC 4.78 4.38*   HGB 14.1 12.6*   HCT 41.0 38.1*   * 158   MCV 86 87   MCH 29.5 28.8   MCHC 34.4 33.1       CMP:   Recent Labs     07/07/25  0502 07/08/25 0227   * 137   K 3.7 3.6    106   CO2 24 24   BUN 10 7   CREATININE 0.9 0.9   GLU 94 84   MG 2.0 1.9   PHOS 1.3* 1.4*   CALCIUM 8.1* 7.9*   ALBUMIN 2.7* 2.7*   PROT 5.5* 5.4*   ALKPHOS 53 48   ALT 36 26   AST 16 11   BILITOT 1.2* 1.0       INR:  No results for input(s): "PT", "INR", "PROTIME", "APTT" in the last 72 hours.    Diagnostic Studies: No relevant studies.    EKG:   Results for orders placed or performed during the hospital encounter of 07/05/25   EKG 12-lead    Collection " Time: 07/05/25  1:12 PM   Result Value Ref Range    QRS Duration 92 ms    [facility] QTC Calculation 424 ms    Narrative    Test Reason : Z13.6,    Vent. Rate : 110 BPM     Atrial Rate : 110 BPM     P-R Int : 136 ms          QRS Dur :  92 ms      QT Int : 314 ms       P-R-T Axes :  79  95  65 degrees    QTcB Int : 424 ms    Sinus tachycardia  Incomplete right bundle branch block  Rightward axis    Borderline Abnormal ECG  When compared with ECG of 04-Apr-2025 08:34,  Vent. rate has increased by  40 bpm  Confirmed by Shawn Milton ([id number]) on 7/6/2025 10:30:22 AM    Referred By: AAAREFERRAL SELF           Confirmed By: Shawn Milton        2D ECHO:   No results found for this or any previous visit.         ASSESSMENT/PLAN:           Pre-op Assessment    I have reviewed the Patient Summary Reports.     I have reviewed the Nursing Notes. I have reviewed the NPO Status.   I have reviewed the Medications.     Review of Systems  Anesthesia Hx:   History of prior surgery of interest to airway management or planning:          Denies Family Hx of Anesthesia complications.    Denies Personal Hx of Anesthesia complications.                        Physical Exam  General: Well nourished, Cooperative, Alert and Oriented    Airway:  Mallampati: II   Mouth Opening: Normal  TM Distance: Normal  Tongue: Normal  Neck ROM: Normal ROM    Dental:  Intact    Chest/Lungs:  Clear to auscultation, Normal Respiratory Rate    Heart:  Rate: Normal  Rhythm: Regular Rhythm        Anesthesia Plan  Type of Anesthesia, risks & benefits discussed:    Anesthesia Type: Gen Natural Airway  Intra-op Monitoring Plan: Standard ASA Monitors  Post Op Pain Control Plan: multimodal analgesia and IV/PO Opioids PRN  Induction:  IV  Airway Plan: Video  Informed Consent: Informed consent signed with the Patient and all parties understand the risks and agree with anesthesia plan.  All questions answered.   ASA Score: 2  Day of Surgery Review of History & Physical: H&P  Update referred to the surgeon/provider.    Ready For Surgery From Anesthesia Perspective.     .           [1]   Patient Active Problem List  Diagnosis    Gynecomastia    On pre-exposure prophylaxis for HIV    Diarrhea    Dehydration   [2]   No current facility-administered medications on file prior to encounter.     Current Outpatient Medications on File Prior to Encounter   Medication Sig Dispense Refill    AKLIEF 0.005 % Crea APPLY A PEA SIZED AMOUNT TO WHOLE FACE ONCE NIGHTLY.      dutasteride (AVODART) 0.5 mg capsule Take 0.5 mg by mouth once daily.      minoxidiL (LONITEN) 2.5 MG tablet Take 5 mg by mouth once daily.      mupirocin (BACTROBAN) 2 % ointment Apply topically 2 (two) times daily.     [3]   Social History  Socioeconomic History    Marital status: Single   Tobacco Use    Smoking status: Former     Current packs/day: 1.00     Average packs/day: 1 pack/day for 3.0 years (3.0 ttl pk-yrs)     Types: Cigarettes    Smokeless tobacco: Never   Substance and Sexual Activity    Alcohol use: No    Drug use: No    Sexual activity: Yes     Partners: Male     Social Drivers of Health     Financial Resource Strain: Low Risk  (7/8/2025)    Overall Financial Resource Strain (CARDIA)     Difficulty of Paying Living Expenses: Not very hard   Food Insecurity: No Food Insecurity (7/8/2025)    Hunger Vital Sign     Worried About Running Out of Food in the Last Year: Never true     Ran Out of Food in the Last Year: Never true   Transportation Needs: No Transportation Needs (7/8/2025)    PRAPARE - Transportation     Lack of Transportation (Medical): No     Lack of Transportation (Non-Medical): No   Stress: No Stress Concern Present (7/8/2025)    [employer] - Occupational Stress Questionnaire     Feeling of Stress : Not at all   Housing Stability: Low Risk  (7/8/2025)    Housing Stability Vital Sign     Unable to Pay for Housing in the Last Year: No     Homeless in the Last Year: No

## 2025-07-08 NOTE — ASSESSMENT & PLAN NOTE
Diarrheal illness complicated by tachycardia, fever, and dehydration.  - dc Vanc, continue Zosyn  - C diff/Rotavirus/Crypto/Giardia ordered, All negative  - Aggressive IV/Po rehydration with LR therapy initiated  - BCX NGTD, UA unremarkable  - Following stool studies  - GI consulted given unclear clinical picture and lab interpretation  - Colonoscopy conducted 7/8 with multiple tissue samples taken throughout the colon.  - GI pathogen panel negative to date  - Low stool elastase suggestive of severe exocrine pancreatic insufficiency at baseline.

## 2025-07-08 NOTE — NURSING
Pt stool watery, pt still has golytely left, about half. Wasn't able to finish. Will continue to monitor.

## 2025-07-08 NOTE — NURSING TRANSFER
Nursing Transfer Note      7/8/2025   1:39 PM    Nurse giving handoff:BONNIE Giles PACU  Nurse receiving handoff:BONNIE Hinton MTU    Reason patient is being transferred: post anesthesia    Transfer To: 631    Transfer via stretcher    Transfer with cardiac monitoring    Transported by PCT    Transfer Vital Signs:  Blood Pressure:121/84  Heart Rate:87  O2:96%-- RA  Temperature:  Respirations:14    Telemetry: Box Number [id number], Rate 80, and Rhythm NSR  Order for Tele Monitor? Yes      Medicines sent: IV potassium infusing per orders    Any special needs or follow-up needed: n/a    Patient belongings transferred with patient: Yes and cell phone    Chart send with patient: Yes    Notified: mom    Patient reassessed at: 7/8/25@ 1336

## 2025-07-08 NOTE — NURSING
Pt. asking to leave AMA, on-call team notified. DO Barnes reports he is on his way to speak with pt.

## 2025-07-08 NOTE — H&P
Short Stay Endoscopy History and Physical    PCP - Lakshmi Callahan MD    Procedure - Colonoscopy  ASA - per anesthesia  Mallampati - per anesthesia  Plan of anesthesia - MAC    HPI:  This is a 39 y.o. male here for evaluation of : diarrhea    ROS:  Constitutional: No fevers, chills  CV: No chest pain  Pulm: No cough  Ophtho: No vision changes  GI: see HPI  Derm: No rash    Medical History:  has no past medical history on file.    Surgical History:  has no past surgical history on file.    Family History: family history includes Heart attack in his maternal grandfather; Hyperlipidemia in his father and mother.. Otherwise no colon cancer, inflammatory bowel disease, or GI malignancies.    Social History:  reports that he has quit smoking. His smoking use included cigarettes. He has a 3 pack-year smoking history. He has never used smokeless tobacco. He reports that he does not drink alcohol and does not use drugs.    Review of patient's allergies indicates:  No Known Allergies    Medications:   Prescriptions Prior to Admission[1]      Vital Signs:   Vitals:    07/08/25 1140   BP: 113/77   Pulse: 71   Resp: 18   Temp: 98.1 °F (36.7 °C)       General Appearance: Well appearing in no acute distress    Labs:  Lab Results   Component Value Date    WBC 7.48 07/08/2025    HGB 12.6 (L) 07/08/2025    HCT 38.1 (L) 07/08/2025     07/08/2025    CHOL 175 05/01/2025    TRIG 55 05/01/2025    HDL 67 05/01/2025    ALT 26 07/08/2025    AST 11 07/08/2025     07/08/2025    K 3.6 07/08/2025     07/08/2025    CREATININE 0.9 07/08/2025    BUN 7 07/08/2025    CO2 24 07/08/2025    TSH 0.850 05/01/2025    HGBA1C 4.7 (L) 12/28/2018       I have explained the risks and benefits of endoscopy procedures to the patient/their POA including but not limited to bleeding, perforation, infection, and death.  The patient/their POA was asked if they understand and allowed to ask any further questions to their  satisfaction.    Shady Cha MD         [1]   Medications Prior to Admission   Medication Sig Dispense Refill Last Dose/Taking    AKLIEF 0.005 % Crea APPLY A PEA SIZED AMOUNT TO WHOLE FACE ONCE NIGHTLY.   Past Month    dutasteride (AVODART) 0.5 mg capsule Take 0.5 mg by mouth once daily.   Taking    minoxidiL (LONITEN) 2.5 MG tablet Take 5 mg by mouth once daily.   Taking    mupirocin (BACTROBAN) 2 % ointment Apply topically 2 (two) times daily.   Taking

## 2025-07-08 NOTE — SUBJECTIVE & OBJECTIVE
Interval History: Diarrhea unchanged (patient is taking scope prep). Painful spasms in abdomen from diarrhea.    Review of Systems  Objective:     Vital Signs (Most Recent):  Temp: 98.6 °F (37 °C) (07/08/25 1300)  Pulse: 96 (07/08/25 1457)  Resp: 14 (07/08/25 1330)  BP: 121/84 (07/08/25 1330)  SpO2: 96 % (07/08/25 1330) Vital Signs (24h Range):  Temp:  [97.6 °F (36.4 °C)-98.7 °F (37.1 °C)] 98.6 °F (37 °C)  Pulse:  [] 96  Resp:  [14-18] 14  SpO2:  [96 %-100 %] 96 %  BP: ()/(57-84) 121/84     Weight: 70.3 kg (155 lb)  Body mass index is 21.62 kg/m².    Intake/Output Summary (Last 24 hours) at 7/8/2025 1504  Last data filed at 7/8/2025 1250  Gross per 24 hour   Intake 150 ml   Output --   Net 150 ml         Physical Exam  Constitutional:       General: He is in acute distress.      Appearance: He is ill-appearing and diaphoretic.   HENT:      Head: Normocephalic and atraumatic.      Nose: Nose normal.      Mouth/Throat:      Pharynx: Oropharynx is clear.   Eyes:      Extraocular Movements: Extraocular movements intact.      Conjunctiva/sclera: Conjunctivae normal.      Pupils: Pupils are equal, round, and reactive to light.   Cardiovascular:      Rate and Rhythm: Normal rate and regular rhythm.      Pulses: Normal pulses.      Heart sounds: Normal heart sounds.   Pulmonary:      Effort: Pulmonary effort is normal.      Breath sounds: Normal breath sounds.   Abdominal:      General: Abdomen is flat. There is no distension.      Palpations: Abdomen is soft.      Tenderness: There is abdominal tenderness. There is no guarding.      Comments: Pan-abdominal tenderness.    Musculoskeletal:         General: Normal range of motion.      Cervical back: Normal range of motion and neck supple.   Skin:     General: Skin is warm.      Coloration: Skin is pale.   Neurological:      General: No focal deficit present.      Mental Status: He is alert and oriented to person, place, and time.      Motor: Weakness present.    Psychiatric:         Mood and Affect: Mood normal.         Behavior: Behavior normal.               Significant Labs: All pertinent labs within the past 24 hours have been reviewed.    Significant Imaging: I have reviewed all pertinent imaging results/findings within the past 24 hours.

## 2025-07-08 NOTE — PROGRESS NOTES
[facility] Medicine  Progress Note    Patient Name: Neel Soliman  MRN: [id number]  Patient Class: IP- Inpatient   Admission Date: 7/5/2025  Length of Stay: 3 days  Attending Physician: Eren Smiley MD  Primary Care Provider: Lakshmi Callahan MD        Subjective     Principal Problem:Diarrhea        HPI:  Patient is a 40 y/o WM presenting for acute onset abdominal pain, fever, and diarrheal illness. Most of the history is provided by his mother, who is accompanying him. He began having diarrhea on 7/4 with approximately 12 loose bowel movements at home. He was brought in by his mother and father who were concerned about his illness. His mother reports several instances of similar diarrheal illnesses in the past, some of which have required hospitalization. She reports that the disturbances can sometimes coincide with spicy food but are oftentimes random. She has a personal history of pancreatic insufficiency and requires dietary enzyme supplementation. She says that he was working all day prior to the onset of the symptoms, and has been working outside in a fruit garden recently. He has an extensive travel history, and has recently returned from two long-distance flights within the continental [place]. He frequently travels back and forth between [place] and a residence in [place]. He has taken the same pancreatic supplements as his mother in the past and she reports mixed improvement in his symptoms. He has no pets currently. 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    Overview/Hospital Course:  Patient presenting with diarrheal illness. Given aggressive oral/IV rehydration therapy and electrolyte replacement. Extensive infectious vs autoimmune workup ordered. Patient was placed on bland diet to encourage oral intake. Symptomatic care. Colonoscopy done 7/8/25.     Interval History: Diarrhea unchanged (patient is taking scope prep). Painful  spasms in abdomen from diarrhea.    Review of Systems  Objective:     Vital Signs (Most Recent):  Temp: 98.6 °F (37 °C) (07/08/25 1300)  Pulse: 96 (07/08/25 1457)  Resp: 14 (07/08/25 1330)  BP: 121/84 (07/08/25 1330)  SpO2: 96 % (07/08/25 1330) Vital Signs (24h Range):  Temp:  [97.6 °F (36.4 °C)-98.7 °F (37.1 °C)] 98.6 °F (37 °C)  Pulse:  [] 96  Resp:  [14-18] 14  SpO2:  [96 %-100 %] 96 %  BP: ()/(57-84) 121/84     Weight: 70.3 kg (155 lb)  Body mass index is 21.62 kg/m².    Intake/Output Summary (Last 24 hours) at 7/8/2025 1504  Last data filed at 7/8/2025 1250  Gross per 24 hour   Intake 150 ml   Output --   Net 150 ml         Physical Exam  Constitutional:       General: He is in acute distress.      Appearance: He is ill-appearing and diaphoretic.   HENT:      Head: Normocephalic and atraumatic.      Nose: Nose normal.      Mouth/Throat:      Pharynx: Oropharynx is clear.   Eyes:      Extraocular Movements: Extraocular movements intact.      Conjunctiva/sclera: Conjunctivae normal.      Pupils: Pupils are equal, round, and reactive to light.   Cardiovascular:      Rate and Rhythm: Normal rate and regular rhythm.      Pulses: Normal pulses.      Heart sounds: Normal heart sounds.   Pulmonary:      Effort: Pulmonary effort is normal.      Breath sounds: Normal breath sounds.   Abdominal:      General: Abdomen is flat. There is no distension.      Palpations: Abdomen is soft.      Tenderness: There is abdominal tenderness. There is no guarding.      Comments: Pan-abdominal tenderness.    Musculoskeletal:         General: Normal range of motion.      Cervical back: Normal range of motion and neck supple.   Skin:     General: Skin is warm.      Coloration: Skin is pale.   Neurological:      General: No focal deficit present.      Mental Status: He is alert and oriented to person, place, and time.      Motor: Weakness present.   Psychiatric:         Mood and Affect: Mood normal.         Behavior: Behavior  normal.               Significant Labs: All pertinent labs within the past 24 hours have been reviewed.    Significant Imaging: I have reviewed all pertinent imaging results/findings within the past 24 hours.      Assessment & Plan  Diarrhea  Dehydration  Diarrheal illness complicated by tachycardia, fever, and dehydration.  - dc Vanc, continue Zosyn  - C diff/Rotavirus/Crypto/Giardia ordered, All negative  - Aggressive IV/Po rehydration with LR therapy initiated  - BCX NGTD, UA unremarkable  - Following stool studies  - GI consulted given unclear clinical picture and lab interpretation  - Colonoscopy conducted 7/8 with multiple tissue samples taken throughout the colon.  - GI pathogen panel negative to date  - Low stool elastase suggestive of severe exocrine pancreatic insufficiency at baseline.    VTE Risk Mitigation (From admission, onward)           Ordered     IP VTE LOW RISK PATIENT  Once         07/05/25 1825     Place sequential compression device  Until discontinued         07/05/25 1825                    Discharge Planning   CALEB: 7/11/2025     Code Status: Full Code   Medical Readiness for Discharge Date:   Discharge Plan A: Home with family   Discharge Delays: None known at this time                    Rubio Johnson MD  Department of Hospital Medicine   [facility]

## 2025-07-08 NOTE — ANESTHESIA POSTPROCEDURE EVALUATION
Anesthesia Post Evaluation    Patient: Neel Soliman    Procedure(s) Performed: Procedure(s) (LRB):  COLONOSCOPY (N/A)    Final Anesthesia Type: general      Patient location during evaluation: PACU  Patient participation: Yes- Able to Participate  Level of consciousness: awake and alert and oriented  Post-procedure vital signs: reviewed and stable  Pain management: adequate  Airway patency: patent    PONV status at discharge: No PONV  Anesthetic complications: no      Cardiovascular status: blood pressure returned to baseline  Respiratory status: unassisted and spontaneous ventilation  Hydration status: euvolemic  Follow-up not needed.          Vitals Value Taken Time   BP 92/53 07/08/25 16:26   Temp 36.3 °C (97.4 °F) 07/08/25 16:26   Pulse 90 07/08/25 16:26   Resp 17 07/08/25 16:26   SpO2 99 % 07/08/25 16:26         Event Time   Out of Recovery 13:51:00         Pain/Edilson Score: Pain Rating Prior to Med Admin: 7 (7/8/2025  8:01 AM)  Edilson Score: 9 (7/8/2025  1:30 PM)

## 2025-07-09 VITALS
TEMPERATURE: 98 F | SYSTOLIC BLOOD PRESSURE: 110 MMHG | RESPIRATION RATE: 17 BRPM | DIASTOLIC BLOOD PRESSURE: 59 MMHG | OXYGEN SATURATION: 100 % | HEIGHT: 71 IN | WEIGHT: 155 LBS | HEART RATE: 89 BPM | BODY MASS INDEX: 21.7 KG/M2

## 2025-07-09 LAB
ABSOLUTE EOSINOPHIL (OHS): 0.08 K/UL
ABSOLUTE MONOCYTE (OHS): 0.73 K/UL (ref 0.3–1)
ABSOLUTE NEUTROPHIL COUNT (OHS): 3.97 K/UL (ref 1.8–7.7)
ALBUMIN SERPL BCP-MCNC: 2.7 G/DL (ref 3.5–5.2)
ALP SERPL-CCNC: 55 UNIT/L (ref 40–150)
ALT SERPL W/O P-5'-P-CCNC: 25 UNIT/L (ref 10–44)
ANION GAP (OHS): 6 MMOL/L (ref 8–16)
AST SERPL-CCNC: 11 UNIT/L (ref 11–45)
BASOPHILS # BLD AUTO: 0.04 K/UL
BASOPHILS NFR BLD AUTO: 0.5 %
BILIRUB SERPL-MCNC: 0.5 MG/DL (ref 0.1–1)
BUN SERPL-MCNC: 6 MG/DL (ref 6–20)
CALCIUM SERPL-MCNC: 8.1 MG/DL (ref 8.7–10.5)
CHLORIDE SERPL-SCNC: 111 MMOL/L (ref 95–110)
CO2 SERPL-SCNC: 24 MMOL/L (ref 23–29)
CREAT SERPL-MCNC: 0.9 MG/DL (ref 0.5–1.4)
ERYTHROCYTE [DISTWIDTH] IN BLOOD BY AUTOMATED COUNT: 13 % (ref 11.5–14.5)
GFR SERPLBLD CREATININE-BSD FMLA CKD-EPI: >60 ML/MIN/1.73/M2
GLUCOSE SERPL-MCNC: 88 MG/DL (ref 70–110)
HCT VFR BLD AUTO: 37.3 % (ref 40–54)
HGB BLD-MCNC: 12.4 GM/DL (ref 14–18)
IMM GRANULOCYTES # BLD AUTO: 0.04 K/UL (ref 0–0.04)
IMM GRANULOCYTES NFR BLD AUTO: 0.5 % (ref 0–0.5)
LYMPHOCYTES # BLD AUTO: 2.51 K/UL (ref 1–4.8)
MAGNESIUM SERPL-MCNC: 1.8 MG/DL (ref 1.6–2.6)
MCH RBC QN AUTO: 29 PG (ref 27–31)
MCHC RBC AUTO-ENTMCNC: 33.2 G/DL (ref 32–36)
MCV RBC AUTO: 87 FL (ref 82–98)
NUCLEATED RBC (/100WBC) (OHS): 0 /100 WBC
PHOSPHATE SERPL-MCNC: 3.5 MG/DL (ref 2.7–4.5)
PLATELET # BLD AUTO: 167 K/UL (ref 150–450)
PMV BLD AUTO: 9.3 FL (ref 9.2–12.9)
POTASSIUM SERPL-SCNC: 3.4 MMOL/L (ref 3.5–5.1)
PROT SERPL-MCNC: 5.2 GM/DL (ref 6–8.4)
RBC # BLD AUTO: 4.27 M/UL (ref 4.6–6.2)
RELATIVE EOSINOPHIL (OHS): 1.1 %
RELATIVE LYMPHOCYTE (OHS): 34.1 % (ref 18–48)
RELATIVE MONOCYTE (OHS): 9.9 % (ref 4–15)
RELATIVE NEUTROPHIL (OHS): 53.9 % (ref 38–73)
SODIUM SERPL-SCNC: 141 MMOL/L (ref 136–145)
WBC # BLD AUTO: 7.37 K/UL (ref 3.9–12.7)

## 2025-07-09 PROCEDURE — 63600175 PHARM REV CODE 636 W HCPCS

## 2025-07-09 PROCEDURE — 80053 COMPREHEN METABOLIC PANEL: CPT

## 2025-07-09 PROCEDURE — 99232 SBSQ HOSP IP/OBS MODERATE 35: CPT | Mod: ,,,

## 2025-07-09 PROCEDURE — 25000003 PHARM REV CODE 250

## 2025-07-09 PROCEDURE — 84100 ASSAY OF PHOSPHORUS: CPT

## 2025-07-09 PROCEDURE — 36415 COLL VENOUS BLD VENIPUNCTURE: CPT

## 2025-07-09 PROCEDURE — 85025 COMPLETE CBC W/AUTO DIFF WBC: CPT

## 2025-07-09 PROCEDURE — 83735 ASSAY OF MAGNESIUM: CPT

## 2025-07-09 RX ORDER — ELECTROLYTES/DEXTROSE
400 SOLUTION, ORAL ORAL
Status: DISCONTINUED | OUTPATIENT
Start: 2025-07-09 | End: 2025-07-09 | Stop reason: HOSPADM

## 2025-07-09 RX ORDER — CIPROFLOXACIN 500 MG/1
500 TABLET, FILM COATED ORAL EVERY 12 HOURS
Qty: 4 TABLET | Refills: 0 | Status: SHIPPED | OUTPATIENT
Start: 2025-07-09 | End: 2025-07-11

## 2025-07-09 RX ORDER — POTASSIUM CHLORIDE 20 MEQ/1
40 TABLET, EXTENDED RELEASE ORAL ONCE
Status: COMPLETED | OUTPATIENT
Start: 2025-07-09 | End: 2025-07-09

## 2025-07-09 RX ORDER — METRONIDAZOLE 500 MG/1
500 TABLET ORAL EVERY 12 HOURS
Qty: 4 TABLET | Refills: 0 | Status: SHIPPED | OUTPATIENT
Start: 2025-07-09 | End: 2025-07-11

## 2025-07-09 RX ORDER — DICYCLOMINE HYDROCHLORIDE 10 MG/1
10 CAPSULE ORAL 4 TIMES DAILY PRN
Qty: 90 CAPSULE | Refills: 0 | Status: SHIPPED | OUTPATIENT
Start: 2025-07-09 | End: 2025-08-01

## 2025-07-09 RX ORDER — LOPERAMIDE HYDROCHLORIDE 2 MG/1
2 CAPSULE ORAL DAILY PRN
Qty: 10 CAPSULE | Refills: 0 | Status: SHIPPED | OUTPATIENT
Start: 2025-07-09

## 2025-07-09 RX ADMIN — POTASSIUM CHLORIDE 40 MEQ: 1500 TABLET, EXTENDED RELEASE ORAL at 09:07

## 2025-07-09 RX ADMIN — OXYCODONE 5 MG: 5 TABLET ORAL at 05:07

## 2025-07-09 RX ADMIN — PIPERACILLIN SODIUM AND TAZOBACTAM SODIUM 4.5 G: 4; .5 INJECTION, POWDER, FOR SOLUTION INTRAVENOUS at 05:07

## 2025-07-09 RX ADMIN — DICYCLOMINE HYDROCHLORIDE 10 MG: 10 CAPSULE ORAL at 08:07

## 2025-07-09 NOTE — PLAN OF CARE
[facility]  Discharge Reassessment    Primary Care Provider: Lakshmi Callahan MD    Expected Discharge Date: 7/9/2025        Melinda met with pt and pt's mother bedside. Pt reported that there are no needs from case management at this time as pt is ready to dc home. Pt expressed that he feels better but if he continues to remain in the hospital, it will effect his mental. Pt's mom expressed that pt will reside their home before he returns to his apartment.     Discharge Plan A and Plan B have been determined by review of patient's clinical status, future medical and therapeutic needs, and coverage/benefits for post-acute care in coordination with multidisciplinary team members.        Reassessment (most recent)       Discharge Reassessment - 07/09/25 1229          Discharge Reassessment    Assessment Type Discharge Planning Reassessment     Did the patient's condition or plan change since previous assessment? No     Discharge Plan discussed with: Patient (P)      Communicated CALEB with patient/caregiver Yes (P)      Discharge Plan A Home with family (P)      Discharge Plan B Home (P)      DME Needed Upon Discharge  none (P)      Transition of Care Barriers None (P)         Post-Acute Status    Post-Acute Authorization Other (P)      Other Status No Post-Acute Service Needs (P)      Discharge Delays None known at this time (P)                    MELINDA Oconnor  Case Management  [phone]

## 2025-07-09 NOTE — PLAN OF CARE
[facility]  Discharge Final Note    Primary Care Provider: Lakshmi Callahan MD    Expected Discharge Date: 7/9/2025        Patient discharged home with family and no needs from case management.    Discharge Plan A and Plan B have been determined by review of patient's clinical status, future medical and therapeutic needs, and coverage/benefits for post-acute care in coordination with multidisciplinary team members.    Future Appointments   Date Time Provider Department Center   7/14/2025  9:30 AM Lakshmi Callahan MD [facility]   7/23/2025  9:00 AM Sabrina Hartley, NP [facility] [place]   9/2/2025  2:30 PM Yassine Sidhu MD [facility] [place]       Final Discharge Note (most recent)       Final Note - 07/09/25 1549          Final Note    Assessment Type Final Discharge Note (P)      Anticipated Discharge Disposition Home or Self Care (P)      What phone number can be called within the next 1-3 days to see how you are doing after discharge? [phone] (P)      Hospital Resources/Appts/Education Provided Provided patient/caregiver with written discharge plan information;Provided education on problems/symptoms using teachback (P)         Post-Acute Status    Post-Acute Authorization Other (P)      Other Status No Post-Acute Service Needs (P)      Discharge Delays None known at this time (P)                      Important Message from Medicare Jacqueline Marin, SW  Case Management  [phone]

## 2025-07-09 NOTE — SUBJECTIVE & OBJECTIVE
Subjective:     Interval History: Followed up with patient s/p Colonoscopy 7/8/2025 that noted erythematous mucosa in the colon. Biopsies taken and path currently pending. Patient has been afebrile. Loose stools but no more bloody BMs reported. He is eager to go home. Some abdominal discomfort but denies tenderness.    Review of Systems   Constitutional:  Negative for activity change, diaphoresis, fever and unexpected weight change.   HENT:  Negative for sore throat and trouble swallowing.    Eyes:  Negative for redness.   Gastrointestinal:  Positive for blood in stool and diarrhea. Negative for abdominal distention, abdominal pain, anal bleeding, constipation, nausea, rectal pain and vomiting.   Skin:  Negative for color change, pallor and rash.   Neurological:  Negative for dizziness, syncope, weakness and headaches.     Objective:     Vital Signs (Most Recent):  Temp: 98 °F (36.7 °C) (07/09/25 0748)  Pulse: 78 (07/09/25 0748)  Resp: 18 (07/09/25 0748)  BP: 108/71 (07/09/25 0748)  SpO2: 100 % (07/09/25 0748) Vital Signs (24h Range):  Temp:  [97.4 °F (36.3 °C)-98.7 °F (37.1 °C)] 98 °F (36.7 °C)  Pulse:  [] 78  Resp:  [14-20] 18  SpO2:  [96 %-100 %] 100 %  BP: ()/(53-84) 108/71     Weight: 70.3 kg (155 lb) (07/08/25 0233)  Body mass index is 21.62 kg/m².      Intake/Output Summary (Last 24 hours) at 7/9/2025 0851  Last data filed at 7/8/2025 1300  Gross per 24 hour   Intake 521.93 ml   Output --   Net 521.93 ml       Lines/Drains/Airways       Peripheral Intravenous Line  Duration             Peripheral IV Single Lumen 07/05/25 1304 18 G Left Forearm 3 days                     Physical Exam  Vitals reviewed.   Constitutional:       General: He is not in acute distress.     Appearance: Normal appearance. He is ill-appearing.   HENT:      Mouth/Throat:      Mouth: Mucous membranes are moist.      Pharynx: Oropharynx is clear. No oropharyngeal exudate.   Eyes:      General: No scleral icterus.  Abdominal:       General: Abdomen is flat. Bowel sounds are normal. There is no distension.      Palpations: Abdomen is soft. There is no mass.      Tenderness: There is no abdominal tenderness (some mild discomfort, but no tenderness to palpation).      Hernia: No hernia is present.   Skin:     General: Skin is warm and dry.      Capillary Refill: Capillary refill takes less than 2 seconds.      Coloration: Skin is not jaundiced or pale.      Findings: No bruising or erythema.   Neurological:      Mental Status: He is alert and oriented to person, place, and time. Mental status is at baseline.          Significant Labs:  CBC:   Recent Labs   Lab 07/08/25  0227 07/09/25  0409   WBC 7.48 7.37   HGB 12.6* 12.4*   HCT 38.1* 37.3*    167     CMP:   Recent Labs   Lab 07/09/25  0409   GLU 88   CALCIUM 8.1*   ALBUMIN 2.7*   PROT 5.2*      K 3.4*   CO2 24   *   BUN 6   CREATININE 0.9   ALKPHOS 55   ALT 25   AST 11   BILITOT 0.5         Significant Imaging:  Imaging results within the past 24 hours have been reviewed.

## 2025-07-09 NOTE — TREATMENT PLAN
GI Treatment Plan     Colonoscopy 7/8/25  Impression:            - The examined portion of the ileum was normal.                          Biopsied.                          - Congested and erythematous mucosa in the rectum,                          in the sigmoid colon and in the descending colon.                          Biopsied.   Recommendation:        - [facility].                          - Resume previous diet.                          - Continue present medications.                          - Await pathology results.                          - The findings and recommendations were discussed                          with the patient.                          - The findings and recommendations were discussed                          with the patient's family.                          - The findings and recommendations were discussed                          with the referring physician.                          - Repeat colonoscopy date to be determined after                          pending pathology results are reviewed for                          surveillance.     GI will continue to follow.     Qian Vallejo PGY4  Gastroenterology

## 2025-07-09 NOTE — DISCHARGE SUMMARY
[facility] Medicine  Discharge Summary      Patient Name: Neel Soliman  MRN: [id number]  GILL: [id number]  Patient Class: IP- Inpatient  Admission Date: 7/5/2025  Hospital Length of Stay: 4 days  Discharge Date and Time: 07/09/2025 11:52 AM  Attending Physician: Eren Smiley MD   Discharging Provider: Rubio Johnson MD  Primary Care Provider: Lakshmi Callahan MD  [facility] Medicine Team: [facility] 6 Rubio Johnson MD  Primary Care Team: [facility] 6    HPI:   Patient is a 38 y/o WM presenting for acute onset abdominal pain, fever, and diarrheal illness. Most of the history is provided by his mother, who is accompanying him. He began having diarrhea on 7/4 with approximately 12 loose bowel movements at home. He was brought in by his mother and father who were concerned about his illness. His mother reports several instances of similar diarrheal illnesses in the past, some of which have required hospitalization. She reports that the disturbances can sometimes coincide with spicy food but are oftentimes random. She has a personal history of pancreatic insufficiency and requires dietary enzyme supplementation. She says that he was working all day prior to the onset of the symptoms, and has been working outside in a fruit garden recently. He has an extensive travel history, and has recently returned from two long-distance flights within the continental [place]. He frequently travels back and forth between [place] and a residence in [place]. He has taken the same pancreatic supplements as his mother in the past and she reports mixed improvement in his symptoms. He has no pets currently. 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    Procedure(s) (LRB):  COLONOSCOPY (N/A)      Hospital Course:   Patient presenting with diarrheal illness. Given aggressive oral/IV rehydration therapy and electrolyte replacement. Extensive infectious vs  autoimmune workup ordered. Patient was placed on bland diet to encourage oral intake. Symptomatic care. Colonoscopy done 7/8/25 results pending. IV antibiotics switched to PO on discharge to finish course. Imodium PRN provided. Gi follow up order placed in order to follow path results and discuss evaluation for pancreatic insufficiency given stool elastase sampling.     Goals of Care Treatment Preferences:  Code Status: Full Code         Consults:   Consults (From admission, onward)          Status Ordering Provider     Inpatient consult to Gastroenterology  Once        Provider:  (Not yet assigned)    Completed EULALIA FERGUSON            Assessment & Plan  Diarrhea  Dehydration  Diarrheal illness complicated by tachycardia, fever, and dehydration.  - dc Vanc, continue Zosyn  - C diff/Rotavirus/Crypto/Giardia ordered, All negative  - Aggressive IV/Po rehydration with LR therapy initiated  - BCX NGTD, UA unremarkable  - Following stool studies  - GI consulted given unclear clinical picture and lab interpretation  - Colonoscopy conducted 7/8 with multiple tissue samples taken throughout the colon.  - GI pathogen panel negative to date  - Low stool elastase suggestive of severe exocrine pancreatic insufficiency at baseline.    Final Active Diagnoses:    Diagnosis Date Noted POA    PRINCIPAL PROBLEM:  Diarrhea [R19.7] 07/05/2025 Yes    Dehydration [E86.0] 07/05/2025 Yes      Problems Resolved During this Admission:    Diagnosis Date Noted Date Resolved POA    Sepsis [A41.9] 07/05/2025 07/05/2025 Unknown       Discharged Condition: stable    Disposition: Home    Follow Up: Gastroenterology    Patient Instructions:      Ambulatory referral/consult to Gastroenterology   Standing Status: Future   Referral Priority: Routine Referral Type: Consultation   Referral Reason: Specialty Services Required   Requested Specialty: Gastroenterology   Number of Visits Requested: 1       Significant Diagnostic Studies:  N/A    Pending Diagnostic Studies:       Procedure Component Value Units Date/Time    C. trachomatis/N. gonorrhoeae by AMP DNA [facility]; Urethra [[id number]]     Order Status: Sent Lab Status: No result     Specimen: Genital from Urethra     Gastrointestinal Pathogens Panel, PCR [[id number]]     Order Status: Sent Lab Status: No result     Specimen: Stool            Medications:  Reconciled Home Medications:      Medication List        START taking these medications      ciprofloxacin HCl 500 MG tablet  Commonly known as: CIPRO  Take 1 tablet (500 mg total) by mouth every 12 (twelve) hours. for 2 days     loperamide 2 mg capsule  Commonly known as: IMODIUM  Take 1 capsule (2 mg total) by mouth daily as needed for Diarrhea.     metroNIDAZOLE 500 MG tablet  Commonly known as: FLAGYL  Take 1 tablet (500 mg total) by mouth every 12 (twelve) hours. for 2 days            CONTINUE taking these medications      AKLIEF 0.005 % Crea  Generic drug: trifarotene  APPLY A PEA SIZED AMOUNT TO WHOLE FACE ONCE NIGHTLY.     dutasteride 0.5 mg capsule  Commonly known as: AVODART  Take 0.5 mg by mouth once daily.     minoxidiL 2.5 MG tablet  Commonly known as: LONITEN  Take 5 mg by mouth once daily.     mupirocin 2 % ointment  Commonly known as: BACTROBAN  Apply topically 2 (two) times daily.              Indwelling Lines/Drains at time of discharge:   Lines/Drains/Airways       None                       Time spent on the discharge of patient: 35 minutes         Rubio Johnson MD  Department of Hospital Medicine  [facility]

## 2025-07-09 NOTE — PROGRESS NOTES
[facility]  Gastroenterology  Progress Note    Patient Name: Neel Soliman  MRN: [id number]  Admission Date: 7/5/2025  Hospital Length of Stay: 4 days  Code Status: Full Code   Attending Provider: Eren Smiley MD  Consulting Provider: Violette Curtis NP  Primary Care Physician: Lakshmi Callahan MD  Principal Problem: Diarrhea    Subjective:     Interval History: Followed up with patient s/p Colonoscopy 7/8/2025 that noted erythematous mucosa in the colon. Biopsies taken and path currently pending. Patient has been afebrile. Loose stools but no more bloody BMs reported. He is eager to go home. Some abdominal discomfort but denies tenderness.    Review of Systems   Constitutional:  Negative for activity change, diaphoresis, fever and unexpected weight change.   HENT:  Negative for sore throat and trouble swallowing.    Eyes:  Negative for redness.   Gastrointestinal:  Positive for blood in stool and diarrhea. Negative for abdominal distention, abdominal pain, anal bleeding, constipation, nausea, rectal pain and vomiting.   Skin:  Negative for color change, pallor and rash.   Neurological:  Negative for dizziness, syncope, weakness and headaches.     Objective:     Vital Signs (Most Recent):  Temp: 98 °F (36.7 °C) (07/09/25 0748)  Pulse: 78 (07/09/25 0748)  Resp: 18 (07/09/25 0748)  BP: 108/71 (07/09/25 0748)  SpO2: 100 % (07/09/25 0748) Vital Signs (24h Range):  Temp:  [97.4 °F (36.3 °C)-98.7 °F (37.1 °C)] 98 °F (36.7 °C)  Pulse:  [] 78  Resp:  [14-20] 18  SpO2:  [96 %-100 %] 100 %  BP: ()/(53-84) 108/71     Weight: 70.3 kg (155 lb) (07/08/25 0233)  Body mass index is 21.62 kg/m².      Intake/Output Summary (Last 24 hours) at 7/9/2025 0851  Last data filed at 7/8/2025 1300  Gross per 24 hour   Intake 521.93 ml   Output --   Net 521.93 ml       Lines/Drains/Airways       Peripheral Intravenous Line  Duration             Peripheral IV Single Lumen 07/05/25 1304 18 G Left Forearm 3 days          "            Physical Exam  Vitals reviewed.   Constitutional:       General: He is not in acute distress.     Appearance: Normal appearance. He is ill-appearing.   HENT:      Mouth/Throat:      Mouth: Mucous membranes are moist.      Pharynx: Oropharynx is clear. No oropharyngeal exudate.   Eyes:      General: No scleral icterus.  Abdominal:      General: Abdomen is flat. Bowel sounds are normal. There is no distension.      Palpations: Abdomen is soft. There is no mass.      Tenderness: There is no abdominal tenderness (some mild discomfort, but no tenderness to palpation).      Hernia: No hernia is present.   Skin:     General: Skin is warm and dry.      Capillary Refill: Capillary refill takes less than 2 seconds.      Coloration: Skin is not jaundiced or pale.      Findings: No bruising or erythema.   Neurological:      Mental Status: He is alert and oriented to person, place, and time. Mental status is at baseline.          Significant Labs:  CBC:   Recent Labs   Lab 07/08/25  0227 07/09/25  0409   WBC 7.48 7.37   HGB 12.6* 12.4*   HCT 38.1* 37.3*    167     CMP:   Recent Labs   Lab 07/09/25  0409   GLU 88   CALCIUM 8.1*   ALBUMIN 2.7*   PROT 5.2*      K 3.4*   CO2 24   *   BUN 6   CREATININE 0.9   ALKPHOS 55   ALT 25   AST 11   BILITOT 0.5         Significant Imaging:  Imaging results within the past 24 hours have been reviewed.  Assessment/Plan:     GI  * Diarrhea  39M w/ no significant PMH, MSM on Truvada, presents with to ED w/ CC of diarrhea (now bloody), >12BMs per day.  GI consulted "Guidance with lab interpretation (initially concerning for infectious diarrhea, results coming back negative. pancreatic elastase low, calprotectin very elevated). CTAP w/ "Liquid stool throughout the large bowel noting a few fluid-filled distal small bowel loops. Taken together, findings suggest diarrheal illness, possibly on the basis of infectious or inflammatory enteritis, scattered mesenteric lymph " "nodes, may reflect reactive change. Hepatomegaly."    Plan:  -- Awaiting pathology from colon biopsies to further determine treatment. If patient discharged, can arrange treatment and followup necessary.   -- OK to transition antibiotics to PO if ok per primary team  -- Diet as tolerated           Thank you for your consult. I will follow-up with patient. Please contact us if you have any additional questions.    Violette Curtis NP  Gastroenterology  [facility] - Med Surg  "

## 2025-07-10 LAB
BACTERIA BLD CULT: NORMAL
BACTERIA BLD CULT: NORMAL
BACTERIA STL CULT: NORMAL

## 2025-07-11 ENCOUNTER — PATIENT MESSAGE (OUTPATIENT)
Dept: GASTROENTEROLOGY | Facility: HOSPITAL | Age: 40
End: 2025-07-11
Payer: COMMERCIAL

## 2025-07-14 ENCOUNTER — RESULTS FOLLOW-UP (OUTPATIENT)
Dept: GASTROENTEROLOGY | Facility: CLINIC | Age: 40
End: 2025-07-14
Payer: COMMERCIAL

## 2025-07-14 LAB
MAYO GENERIC ORDERABLE RESULT: NORMAL
MAYO GENERIC ORDERABLE RESULT: NORMAL

## 2025-07-17 LAB
ESTRIOL SERPL-MCNC: NORMAL NG/ML
ESTROGEN SERPL-MCNC: NORMAL PG/ML
INSULIN SERPL-ACNC: NORMAL U[IU]/ML
LAB AP CLINICAL INFORMATION: NORMAL
LAB AP GROSS DESCRIPTION: NORMAL
LAB AP PERFORMING LOCATION(S): NORMAL
LAB AP REPORT FOOTNOTES: NORMAL